# Patient Record
Sex: FEMALE | Race: WHITE | NOT HISPANIC OR LATINO | Employment: FULL TIME | URBAN - METROPOLITAN AREA
[De-identification: names, ages, dates, MRNs, and addresses within clinical notes are randomized per-mention and may not be internally consistent; named-entity substitution may affect disease eponyms.]

---

## 2023-02-03 PROBLEM — D23.9 DERMATOFIBROMA: Status: ACTIVE | Noted: 2023-02-03

## 2023-02-03 PROBLEM — J32.9 SINUSITIS: Status: ACTIVE | Noted: 2023-02-03

## 2023-02-03 PROBLEM — M20.40 HAMMERTOE: Status: ACTIVE | Noted: 2023-02-03

## 2023-02-03 PROBLEM — R07.89 STERNOCOSTAL PAIN: Status: ACTIVE | Noted: 2023-02-03

## 2023-02-03 PROBLEM — D17.30 NEVUS LIPOMATOSUS CUTANEOUS SUPERFICIALIS: Status: ACTIVE | Noted: 2023-02-03

## 2023-02-03 PROBLEM — T88.7XXA MEDICATION SIDE EFFECT: Status: ACTIVE | Noted: 2023-02-03
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
## 2023-02-03 PROBLEM — E03.9 HYPOTHYROIDISM, ADULT: Status: ACTIVE | Noted: 2023-02-03

## 2023-02-03 PROBLEM — E55.9 VITAMIN D DEFICIENCY: Status: ACTIVE | Noted: 2023-02-03

## 2023-02-03 PROBLEM — R73.9 HIGH BLOOD SUGAR: Status: ACTIVE | Noted: 2023-02-03

## 2023-02-03 PROBLEM — R63.5 WEIGHT GAIN: Status: ACTIVE | Noted: 2023-02-03

## 2023-02-03 PROBLEM — M54.9 BACK PAIN: Status: ACTIVE | Noted: 2023-02-03

## 2023-02-03 PROBLEM — M25.569 KNEE PAIN: Status: ACTIVE | Noted: 2023-02-03

## 2023-02-03 PROBLEM — R53.83 FATIGUE: Status: ACTIVE | Noted: 2023-02-03

## 2023-02-03 PROBLEM — G43.909 MIGRAINES: Status: ACTIVE | Noted: 2023-02-03

## 2023-02-03 PROBLEM — E66.9 OBESITY WITH BODY MASS INDEX (BMI) OF 30.0 TO 39.9: Status: ACTIVE | Noted: 2023-02-03

## 2023-02-03 PROBLEM — E66.9 OBESITY: Status: ACTIVE | Noted: 2023-02-03

## 2023-02-03 PROBLEM — D18.00 ANGIOMA: Status: ACTIVE | Noted: 2023-02-03

## 2023-02-03 PROBLEM — M77.9 TENDINITIS: Status: ACTIVE | Noted: 2023-02-03

## 2023-02-03 PROBLEM — L53.9 BREAST ERYTHEMA: Status: ACTIVE | Noted: 2023-02-03

## 2023-02-03 PROBLEM — M54.2 NECK PAIN: Status: ACTIVE | Noted: 2023-02-03

## 2023-02-03 PROBLEM — E78.5 DYSLIPIDEMIA: Status: ACTIVE | Noted: 2023-02-03

## 2023-02-03 PROBLEM — E87.6 HYPOKALEMIA: Status: ACTIVE | Noted: 2023-02-03

## 2023-02-03 PROBLEM — G58.8 INTERCOSTAL NEURALGIA: Status: ACTIVE | Noted: 2023-02-03

## 2023-02-03 PROBLEM — E66.01 MORBID (SEVERE) OBESITY DUE TO EXCESS CALORIES (MULTI): Status: ACTIVE | Noted: 2023-02-03

## 2023-02-03 PROBLEM — I10 HBP (HIGH BLOOD PRESSURE): Status: ACTIVE | Noted: 2023-02-03

## 2023-02-03 RX ORDER — CHLORTHALIDONE 25 MG/1
1 TABLET ORAL DAILY
COMMUNITY
Start: 2020-06-25 | End: 2023-03-27

## 2023-02-03 RX ORDER — LEVOTHYROXINE SODIUM 50 UG/1
1 TABLET ORAL DAILY
COMMUNITY
Start: 2019-12-03 | End: 2023-03-17 | Stop reason: SDUPTHER

## 2023-02-03 RX ORDER — UBIDECARENONE 30 MG
1 CAPSULE ORAL DAILY
COMMUNITY

## 2023-02-03 RX ORDER — LOSARTAN POTASSIUM 50 MG/1
50 TABLET ORAL DAILY
COMMUNITY
Start: 2020-10-08 | End: 2023-03-27

## 2023-02-03 RX ORDER — ACETAMINOPHEN 500 MG
50 TABLET ORAL DAILY
COMMUNITY
Start: 2019-12-03

## 2023-02-03 RX ORDER — AMLODIPINE BESYLATE 5 MG/1
1 TABLET ORAL DAILY
COMMUNITY
Start: 2019-12-03 | End: 2023-06-15 | Stop reason: SDUPTHER

## 2023-02-03 RX ORDER — SPIRONOLACTONE 25 MG/1
25 TABLET ORAL DAILY
COMMUNITY
Start: 2022-10-06 | End: 2023-03-17 | Stop reason: SDUPTHER

## 2023-03-16 LAB — POTASSIUM (MMOL/L) IN SER/PLAS: 3.9 MMOL/L (ref 3.5–5.3)

## 2023-03-17 ENCOUNTER — OFFICE VISIT (OUTPATIENT)
Dept: PRIMARY CARE | Facility: CLINIC | Age: 53
End: 2023-03-17
Payer: COMMERCIAL

## 2023-03-17 VITALS
BODY MASS INDEX: 45.29 KG/M2 | WEIGHT: 255.6 LBS | SYSTOLIC BLOOD PRESSURE: 110 MMHG | TEMPERATURE: 97.9 F | RESPIRATION RATE: 16 BRPM | HEART RATE: 72 BPM | DIASTOLIC BLOOD PRESSURE: 70 MMHG | HEIGHT: 63 IN

## 2023-03-17 DIAGNOSIS — R73.9 HIGH BLOOD SUGAR: ICD-10-CM

## 2023-03-17 DIAGNOSIS — E87.6 HYPOKALEMIA: ICD-10-CM

## 2023-03-17 DIAGNOSIS — E66.9 OBESITY WITH BODY MASS INDEX (BMI) OF 30.0 TO 39.9: ICD-10-CM

## 2023-03-17 DIAGNOSIS — E03.9 HYPOTHYROIDISM, ADULT: ICD-10-CM

## 2023-03-17 DIAGNOSIS — I10 PRIMARY HYPERTENSION: Primary | ICD-10-CM

## 2023-03-17 PROCEDURE — 3074F SYST BP LT 130 MM HG: CPT | Performed by: INTERNAL MEDICINE

## 2023-03-17 PROCEDURE — 99214 OFFICE O/P EST MOD 30 MIN: CPT | Performed by: INTERNAL MEDICINE

## 2023-03-17 PROCEDURE — 3078F DIAST BP <80 MM HG: CPT | Performed by: INTERNAL MEDICINE

## 2023-03-17 RX ORDER — SPIRONOLACTONE 25 MG/1
25 TABLET ORAL DAILY
Qty: 90 TABLET | Refills: 1 | Status: SHIPPED | OUTPATIENT
Start: 2023-03-17 | End: 2023-06-15 | Stop reason: SDUPTHER

## 2023-03-17 RX ORDER — LANOLIN ALCOHOL/MO/W.PET/CERES
500 CREAM (GRAM) TOPICAL DAILY
COMMUNITY

## 2023-03-17 RX ORDER — LEVOTHYROXINE SODIUM 50 UG/1
50 TABLET ORAL DAILY
Qty: 90 TABLET | Refills: 1 | Status: SHIPPED | OUTPATIENT
Start: 2023-03-17 | End: 2023-06-15 | Stop reason: SDUPTHER

## 2023-03-17 ASSESSMENT — ENCOUNTER SYMPTOMS
COUGH: 1
JOINT SWELLING: 0
WHEEZING: 0
UNEXPECTED WEIGHT CHANGE: 0
VOMITING: 0
WEAKNESS: 0
PALPITATIONS: 0
NAUSEA: 0
FATIGUE: 1
SLEEP DISTURBANCE: 0
ADENOPATHY: 0
CONSTIPATION: 0
CONFUSION: 0
CHEST TIGHTNESS: 0
ABDOMINAL PAIN: 0
DYSURIA: 0
SHORTNESS OF BREATH: 0
CHILLS: 0
DIZZINESS: 0
SORE THROAT: 0
DIARRHEA: 0
CARDIOVASCULAR NEGATIVE: 1
ARTHRALGIAS: 0

## 2023-03-17 ASSESSMENT — PATIENT HEALTH QUESTIONNAIRE - PHQ9
SUM OF ALL RESPONSES TO PHQ9 QUESTIONS 1 AND 2: 0
1. LITTLE INTEREST OR PLEASURE IN DOING THINGS: NOT AT ALL
2. FEELING DOWN, DEPRESSED OR HOPELESS: NOT AT ALL

## 2023-03-17 NOTE — PROGRESS NOTES
Ton Rangel is a 52 y.o. female who presents for Follow-up (Follow up HTN and K level/Labs done yesterday ).  Checking BP at home sometimes.is been good 110s/70s,, compliant with tx,  feels good, no med side effects        Review of Systems   Constitutional:  Positive for fatigue. Negative for chills and unexpected weight change.        Comment   HENT:  Negative for congestion, ear pain and sore throat.    Respiratory:  Positive for cough. Negative for chest tightness, shortness of breath and wheezing.    Cardiovascular: Negative.  Negative for palpitations and leg swelling.   Gastrointestinal:  Negative for abdominal pain, constipation, diarrhea, nausea and vomiting.   Genitourinary:  Negative for dysuria and urgency.   Musculoskeletal:  Negative for arthralgias and joint swelling.   Skin:  Negative for rash.   Neurological:  Negative for dizziness and weakness.   Hematological:  Negative for adenopathy.   Psychiatric/Behavioral:  Negative for confusion and sleep disturbance.        Objective   Physical Exam  Constitutional:       Appearance: Normal appearance.   HENT:      Head: Normocephalic and atraumatic.   Eyes:      Conjunctiva/sclera: Conjunctivae normal.   Neck:      Vascular: No carotid bruit.   Cardiovascular:      Rate and Rhythm: Normal rate and regular rhythm.      Heart sounds: No murmur heard.  Pulmonary:      Effort: No respiratory distress.      Breath sounds: No wheezing, rhonchi or rales.   Chest:      Chest wall: No tenderness.   Abdominal:      General: Bowel sounds are normal. There is no distension.      Palpations: Abdomen is soft. There is no mass.      Tenderness: There is no abdominal tenderness.   Musculoskeletal:         General: No swelling.      Cervical back: Neck supple.      Right lower leg: No edema.      Left lower leg: No edema.   Lymphadenopathy:      Cervical: No cervical adenopathy.   Skin:     Coloration: Skin is not jaundiced.      Findings: No rash.  "  Neurological:      General: No focal deficit present.      Mental Status: She is alert and oriented to person, place, and time. Mental status is at baseline.      Motor: No weakness.      Gait: Gait normal.   Psychiatric:         Mood and Affect: Mood normal.         Behavior: Behavior normal.         Judgment: Judgment normal.       /70 (Patient Position: Sitting)   Pulse 72   Temp 36.6 °C (97.9 °F)   Resp 16   Ht 1.6 m (5' 3\")   Wt 116 kg (255 lb 9.6 oz)   BMI 45.28 kg/m²     Assessment/Plan   Problem List Items Addressed This Visit       HBP (high blood pressure) - Primary    High blood sugar    Hypokalemia     K 3.9, stable for few month, will recheck q3 month         Obesity with body mass index (BMI) of 30.0 to 39.9          "

## 2023-03-25 DIAGNOSIS — I10 ESSENTIAL (PRIMARY) HYPERTENSION: ICD-10-CM

## 2023-03-27 RX ORDER — CHLORTHALIDONE 25 MG/1
TABLET ORAL
Qty: 30 TABLET | Refills: 2 | Status: SHIPPED | OUTPATIENT
Start: 2023-03-27 | End: 2023-10-09 | Stop reason: SDUPTHER

## 2023-03-27 RX ORDER — LOSARTAN POTASSIUM 50 MG/1
TABLET ORAL
Qty: 30 TABLET | Refills: 2 | Status: SHIPPED | OUTPATIENT
Start: 2023-03-27 | End: 2023-08-08

## 2023-04-27 ENCOUNTER — LAB (OUTPATIENT)
Dept: LAB | Facility: LAB | Age: 53
End: 2023-04-27
Payer: COMMERCIAL

## 2023-04-27 DIAGNOSIS — R73.9 HIGH BLOOD SUGAR: ICD-10-CM

## 2023-04-27 DIAGNOSIS — E66.9 OBESITY WITH BODY MASS INDEX (BMI) OF 30.0 TO 39.9: ICD-10-CM

## 2023-04-27 DIAGNOSIS — I10 PRIMARY HYPERTENSION: ICD-10-CM

## 2023-04-27 DIAGNOSIS — E03.9 HYPOTHYROIDISM, ADULT: ICD-10-CM

## 2023-04-27 PROCEDURE — 84443 ASSAY THYROID STIM HORMONE: CPT

## 2023-04-27 PROCEDURE — 82607 VITAMIN B-12: CPT

## 2023-04-27 PROCEDURE — 83036 HEMOGLOBIN GLYCOSYLATED A1C: CPT

## 2023-04-27 PROCEDURE — 80061 LIPID PANEL: CPT

## 2023-04-27 PROCEDURE — 36415 COLL VENOUS BLD VENIPUNCTURE: CPT

## 2023-04-27 PROCEDURE — 85025 COMPLETE CBC W/AUTO DIFF WBC: CPT

## 2023-04-27 PROCEDURE — 82306 VITAMIN D 25 HYDROXY: CPT

## 2023-04-27 PROCEDURE — 86803 HEPATITIS C AB TEST: CPT

## 2023-04-27 PROCEDURE — 80053 COMPREHEN METABOLIC PANEL: CPT

## 2023-04-28 LAB
ALANINE AMINOTRANSFERASE (SGPT) (U/L) IN SER/PLAS: 16 U/L (ref 7–45)
ALBUMIN (G/DL) IN SER/PLAS: 4.2 G/DL (ref 3.4–5)
ALKALINE PHOSPHATASE (U/L) IN SER/PLAS: 96 U/L (ref 33–110)
ANION GAP IN SER/PLAS: 12 MMOL/L (ref 10–20)
ASPARTATE AMINOTRANSFERASE (SGOT) (U/L) IN SER/PLAS: 11 U/L (ref 9–39)
BASOPHILS (10*3/UL) IN BLOOD BY AUTOMATED COUNT: 0.04 X10E9/L (ref 0–0.1)
BASOPHILS/100 LEUKOCYTES IN BLOOD BY AUTOMATED COUNT: 0.4 % (ref 0–2)
BILIRUBIN TOTAL (MG/DL) IN SER/PLAS: 0.4 MG/DL (ref 0–1.2)
CALCIDIOL (25 OH VITAMIN D3) (NG/ML) IN SER/PLAS: 50 NG/ML
CALCIUM (MG/DL) IN SER/PLAS: 9.4 MG/DL (ref 8.6–10.6)
CARBON DIOXIDE, TOTAL (MMOL/L) IN SER/PLAS: 32 MMOL/L (ref 21–32)
CHLORIDE (MMOL/L) IN SER/PLAS: 100 MMOL/L (ref 98–107)
CHOLESTEROL (MG/DL) IN SER/PLAS: 178 MG/DL (ref 0–199)
CHOLESTEROL IN HDL (MG/DL) IN SER/PLAS: 43.3 MG/DL
CHOLESTEROL/HDL RATIO: 4.1
COBALAMIN (VITAMIN B12) (PG/ML) IN SER/PLAS: 365 PG/ML (ref 211–911)
CREATININE (MG/DL) IN SER/PLAS: 0.83 MG/DL (ref 0.5–1.05)
EOSINOPHILS (10*3/UL) IN BLOOD BY AUTOMATED COUNT: 0.14 X10E9/L (ref 0–0.7)
EOSINOPHILS/100 LEUKOCYTES IN BLOOD BY AUTOMATED COUNT: 1.6 % (ref 0–6)
ERYTHROCYTE DISTRIBUTION WIDTH (RATIO) BY AUTOMATED COUNT: 13.7 % (ref 11.5–14.5)
ERYTHROCYTE MEAN CORPUSCULAR HEMOGLOBIN CONCENTRATION (G/DL) BY AUTOMATED: 31.1 G/DL (ref 32–36)
ERYTHROCYTE MEAN CORPUSCULAR VOLUME (FL) BY AUTOMATED COUNT: 94 FL (ref 80–100)
ERYTHROCYTES (10*6/UL) IN BLOOD BY AUTOMATED COUNT: 4.51 X10E12/L (ref 4–5.2)
ESTIMATED AVERAGE GLUCOSE FOR HBA1C: 123 MG/DL
GFR FEMALE: 84 ML/MIN/1.73M2
GLUCOSE (MG/DL) IN SER/PLAS: 117 MG/DL (ref 74–99)
HEMATOCRIT (%) IN BLOOD BY AUTOMATED COUNT: 42.4 % (ref 36–46)
HEMOGLOBIN (G/DL) IN BLOOD: 13.2 G/DL (ref 12–16)
HEMOGLOBIN A1C/HEMOGLOBIN TOTAL IN BLOOD: 5.9 %
HEPATITIS C VIRUS AB PRESENCE IN SERUM: NONREACTIVE
IMMATURE GRANULOCYTES/100 LEUKOCYTES IN BLOOD BY AUTOMATED COUNT: 0.3 % (ref 0–0.9)
LDL: 117 MG/DL (ref 0–99)
LEUKOCYTES (10*3/UL) IN BLOOD BY AUTOMATED COUNT: 9 X10E9/L (ref 4.4–11.3)
LYMPHOCYTES (10*3/UL) IN BLOOD BY AUTOMATED COUNT: 2.8 X10E9/L (ref 1.2–4.8)
LYMPHOCYTES/100 LEUKOCYTES IN BLOOD BY AUTOMATED COUNT: 31.2 % (ref 13–44)
MONOCYTES (10*3/UL) IN BLOOD BY AUTOMATED COUNT: 0.41 X10E9/L (ref 0.1–1)
MONOCYTES/100 LEUKOCYTES IN BLOOD BY AUTOMATED COUNT: 4.6 % (ref 2–10)
NEUTROPHILS (10*3/UL) IN BLOOD BY AUTOMATED COUNT: 5.55 X10E9/L (ref 1.2–7.7)
NEUTROPHILS/100 LEUKOCYTES IN BLOOD BY AUTOMATED COUNT: 61.9 % (ref 40–80)
NRBC (PER 100 WBCS) BY AUTOMATED COUNT: 0 /100 WBC (ref 0–0)
PLATELETS (10*3/UL) IN BLOOD AUTOMATED COUNT: 344 X10E9/L (ref 150–450)
POTASSIUM (MMOL/L) IN SER/PLAS: 4.5 MMOL/L (ref 3.5–5.3)
PROTEIN TOTAL: 6.7 G/DL (ref 6.4–8.2)
SODIUM (MMOL/L) IN SER/PLAS: 139 MMOL/L (ref 136–145)
THYROTROPIN (MIU/L) IN SER/PLAS BY DETECTION LIMIT <= 0.05 MIU/L: 2.06 MIU/L (ref 0.44–3.98)
TRIGLYCERIDE (MG/DL) IN SER/PLAS: 90 MG/DL (ref 0–149)
UREA NITROGEN (MG/DL) IN SER/PLAS: 18 MG/DL (ref 6–23)
VLDL: 18 MG/DL (ref 0–40)

## 2023-06-12 LAB — POTASSIUM (MMOL/L) IN SER/PLAS: 3.8 MMOL/L (ref 3.5–5.3)

## 2023-06-15 ENCOUNTER — TELEPHONE (OUTPATIENT)
Dept: PRIMARY CARE | Facility: CLINIC | Age: 53
End: 2023-06-15
Payer: COMMERCIAL

## 2023-06-15 DIAGNOSIS — E87.6 HYPOKALEMIA: Primary | ICD-10-CM

## 2023-06-15 DIAGNOSIS — I10 PRIMARY HYPERTENSION: ICD-10-CM

## 2023-06-15 DIAGNOSIS — E03.9 HYPOTHYROIDISM, ADULT: Primary | ICD-10-CM

## 2023-06-15 NOTE — TELEPHONE ENCOUNTER
Pt was in office 6/14/23 with her mother. Pt said she had Potassium lab order drawn on 6/12 but there we no other lab orders in. She wants lab orders entered. I explained to her that if lab orders are not already in, Dr. KASPER will order them at the time of her next visit (8/16/23 ). Pt said she wants order before visit.

## 2023-06-16 RX ORDER — SPIRONOLACTONE 25 MG/1
25 TABLET ORAL DAILY
Qty: 90 TABLET | Refills: 0 | Status: SHIPPED | OUTPATIENT
Start: 2023-06-16 | End: 2023-11-06

## 2023-06-16 RX ORDER — AMLODIPINE BESYLATE 5 MG/1
5 TABLET ORAL DAILY
Qty: 90 TABLET | Refills: 0 | Status: SHIPPED | OUTPATIENT
Start: 2023-06-16 | End: 2023-10-09 | Stop reason: SDUPTHER

## 2023-06-16 RX ORDER — LEVOTHYROXINE SODIUM 50 UG/1
50 TABLET ORAL DAILY
Qty: 90 TABLET | Refills: 0 | Status: SHIPPED | OUTPATIENT
Start: 2023-06-16 | End: 2023-10-09 | Stop reason: SDUPTHER

## 2023-08-06 DIAGNOSIS — I10 ESSENTIAL (PRIMARY) HYPERTENSION: Primary | ICD-10-CM

## 2023-08-08 RX ORDER — LOSARTAN POTASSIUM 50 MG/1
TABLET ORAL
Qty: 30 TABLET | Refills: 2 | Status: SHIPPED | OUTPATIENT
Start: 2023-08-08 | End: 2023-11-06

## 2023-08-16 ENCOUNTER — OFFICE VISIT (OUTPATIENT)
Dept: PRIMARY CARE | Facility: CLINIC | Age: 53
End: 2023-08-16
Payer: COMMERCIAL

## 2023-08-16 ENCOUNTER — LAB (OUTPATIENT)
Dept: LAB | Facility: LAB | Age: 53
End: 2023-08-16
Payer: COMMERCIAL

## 2023-08-16 VITALS
HEART RATE: 84 BPM | TEMPERATURE: 97.2 F | RESPIRATION RATE: 16 BRPM | BODY MASS INDEX: 44.61 KG/M2 | SYSTOLIC BLOOD PRESSURE: 110 MMHG | DIASTOLIC BLOOD PRESSURE: 62 MMHG | WEIGHT: 251.8 LBS | HEIGHT: 63 IN

## 2023-08-16 DIAGNOSIS — M54.2 NECK PAIN: ICD-10-CM

## 2023-08-16 DIAGNOSIS — I10 PRIMARY HYPERTENSION: ICD-10-CM

## 2023-08-16 DIAGNOSIS — E87.6 HYPOKALEMIA: ICD-10-CM

## 2023-08-16 DIAGNOSIS — G89.29 CHRONIC PAIN OF LEFT KNEE: ICD-10-CM

## 2023-08-16 DIAGNOSIS — E03.9 HYPOTHYROIDISM, ADULT: ICD-10-CM

## 2023-08-16 DIAGNOSIS — Z00.00 ANNUAL PHYSICAL EXAM: Primary | ICD-10-CM

## 2023-08-16 DIAGNOSIS — E78.5 DYSLIPIDEMIA: ICD-10-CM

## 2023-08-16 DIAGNOSIS — R73.9 HIGH BLOOD SUGAR: ICD-10-CM

## 2023-08-16 DIAGNOSIS — Z12.31 ENCOUNTER FOR SCREENING MAMMOGRAM FOR MALIGNANT NEOPLASM OF BREAST: ICD-10-CM

## 2023-08-16 DIAGNOSIS — E66.01 CLASS 3 SEVERE OBESITY WITH SERIOUS COMORBIDITY AND BODY MASS INDEX (BMI) OF 40.0 TO 44.9 IN ADULT, UNSPECIFIED OBESITY TYPE (MULTI): ICD-10-CM

## 2023-08-16 DIAGNOSIS — M25.562 CHRONIC PAIN OF LEFT KNEE: ICD-10-CM

## 2023-08-16 PROBLEM — E66.9 OBESITY: Status: RESOLVED | Noted: 2023-02-03 | Resolved: 2023-08-16

## 2023-08-16 PROBLEM — R63.5 WEIGHT GAIN: Status: RESOLVED | Noted: 2023-02-03 | Resolved: 2023-08-16

## 2023-08-16 PROBLEM — E66.813 CLASS 3 SEVERE OBESITY WITH SERIOUS COMORBIDITY AND BODY MASS INDEX (BMI) OF 40.0 TO 44.9 IN ADULT: Status: ACTIVE | Noted: 2023-02-03

## 2023-08-16 PROBLEM — E66.9 OBESITY WITH BODY MASS INDEX (BMI) OF 30.0 TO 39.9: Status: RESOLVED | Noted: 2023-02-03 | Resolved: 2023-08-16

## 2023-08-16 PROBLEM — J32.9 SINUSITIS: Status: RESOLVED | Noted: 2023-02-03 | Resolved: 2023-08-16

## 2023-08-16 PROBLEM — M25.569 KNEE PAIN: Status: RESOLVED | Noted: 2023-02-03 | Resolved: 2023-08-16

## 2023-08-16 LAB
MAGNESIUM (MG/DL) IN SER/PLAS: 1.91 MG/DL (ref 1.6–2.4)
POC HEMOGLOBIN A1C: 6.2 % (ref 4.2–6.5)
POTASSIUM (MMOL/L) IN SER/PLAS: 3.5 MMOL/L (ref 3.5–5.3)

## 2023-08-16 PROCEDURE — 36415 COLL VENOUS BLD VENIPUNCTURE: CPT

## 2023-08-16 PROCEDURE — 3074F SYST BP LT 130 MM HG: CPT | Performed by: INTERNAL MEDICINE

## 2023-08-16 PROCEDURE — 99213 OFFICE O/P EST LOW 20 MIN: CPT | Performed by: INTERNAL MEDICINE

## 2023-08-16 PROCEDURE — 3008F BODY MASS INDEX DOCD: CPT | Performed by: INTERNAL MEDICINE

## 2023-08-16 PROCEDURE — 84132 ASSAY OF SERUM POTASSIUM: CPT

## 2023-08-16 PROCEDURE — 99396 PREV VISIT EST AGE 40-64: CPT | Performed by: INTERNAL MEDICINE

## 2023-08-16 PROCEDURE — 3078F DIAST BP <80 MM HG: CPT | Performed by: INTERNAL MEDICINE

## 2023-08-16 PROCEDURE — 83735 ASSAY OF MAGNESIUM: CPT

## 2023-08-16 PROCEDURE — 83036 HEMOGLOBIN GLYCOSYLATED A1C: CPT | Performed by: INTERNAL MEDICINE

## 2023-08-16 ASSESSMENT — ENCOUNTER SYMPTOMS
SHORTNESS OF BREATH: 0
DIZZINESS: 0
SORE THROAT: 0
WEAKNESS: 0
JOINT SWELLING: 0
BACK PAIN: 1
ABDOMINAL PAIN: 0
FATIGUE: 1
NECK PAIN: 1
CHILLS: 0
DIARRHEA: 0
ARTHRALGIAS: 0
DYSURIA: 0
CONSTIPATION: 0
VOMITING: 0
PALPITATIONS: 0
BRUISES/BLEEDS EASILY: 1
COUGH: 0
ADENOPATHY: 0
UNEXPECTED WEIGHT CHANGE: 0
WHEEZING: 0
SLEEP DISTURBANCE: 1
CHEST TIGHTNESS: 0
CONFUSION: 0
NAUSEA: 0

## 2023-08-16 ASSESSMENT — PATIENT HEALTH QUESTIONNAIRE - PHQ9
2. FEELING DOWN, DEPRESSED OR HOPELESS: NOT AT ALL
SUM OF ALL RESPONSES TO PHQ9 QUESTIONS 1 AND 2: 0
1. LITTLE INTEREST OR PLEASURE IN DOING THINGS: NOT AT ALL

## 2023-08-16 NOTE — PROGRESS NOTES
Ton Rangel is a 53 y.o. female who presents for Annual Exam (CPE/Labs - 6.2023).  CPE  Lab -6.2023 and April discussed  Colonoscopy- 10.28.2019- Q10y  Mammogram- 9.8.2021  PAP-2020  Last HGBA1c was 5.9  on 4.27.2023, today is 6.2  Can't sleep sec to legs crams,       Review of Systems   Constitutional:  Positive for fatigue. Negative for chills and unexpected weight change.        Comment   HENT:  Negative for congestion, ear pain and sore throat.    Respiratory:  Negative for cough, chest tightness, shortness of breath and wheezing.    Cardiovascular:  Positive for leg swelling. Negative for palpitations.   Gastrointestinal:  Negative for abdominal pain, constipation, diarrhea, nausea and vomiting.   Genitourinary:  Negative for dysuria and urgency.   Musculoskeletal:  Positive for back pain and neck pain. Negative for arthralgias and joint swelling.   Skin:  Negative for rash.   Neurological:  Negative for dizziness and weakness.   Hematological:  Negative for adenopathy. Bruises/bleeds easily.   Psychiatric/Behavioral:  Positive for sleep disturbance. Negative for confusion.        Objective   Physical Exam  Constitutional:       Appearance: Normal appearance.   HENT:      Head: Normocephalic and atraumatic.   Eyes:      Conjunctiva/sclera: Conjunctivae normal.   Neck:      Vascular: No carotid bruit.   Cardiovascular:      Rate and Rhythm: Normal rate and regular rhythm.      Heart sounds: No murmur heard.  Pulmonary:      Effort: No respiratory distress.      Breath sounds: No wheezing, rhonchi or rales.   Chest:      Chest wall: No tenderness.   Abdominal:      General: Bowel sounds are normal. There is no distension.      Palpations: Abdomen is soft. There is no mass.      Tenderness: There is no abdominal tenderness.   Musculoskeletal:         General: Normal range of motion.      Cervical back: Neck supple.      Right foot: No deformity, bunion or Charcot foot.      Left foot: No  "deformity, bunion or Charcot foot.   Feet:      Right foot:      Skin integrity: Skin integrity normal. No ulcer, callus or fissure.      Toenail Condition: Right toenails are normal.      Left foot:      Skin integrity: Skin integrity normal. No ulcer, callus or fissure.      Toenail Condition: Left toenails are normal.      Comments: Foot exam normal,   Lymphadenopathy:      Cervical: No cervical adenopathy.   Skin:     Coloration: Skin is not jaundiced.      Findings: No rash.   Neurological:      General: No focal deficit present.      Mental Status: She is alert and oriented to person, place, and time. Mental status is at baseline.      Motor: No weakness.      Gait: Gait normal.   Psychiatric:         Mood and Affect: Mood normal.         Behavior: Behavior normal.         Judgment: Judgment normal.       /62 (BP Location: Left arm, Patient Position: Sitting)   Pulse 84   Temp 36.2 °C (97.2 °F)   Resp 16   Ht 1.6 m (5' 3\")   Wt 114 kg (251 lb 12.8 oz)   BMI 44.60 kg/m²       Assessment/Plan   Problem List Items Addressed This Visit       Dyslipidemia    HBP (high blood pressure)    High blood sugar    Relevant Orders    POCT glycosylated hemoglobin (Hb A1C) manually resulted (Completed)    Hypokalemia    Relevant Orders    Potassium    Magnesium    Hypothyroidism, adult    Chronic pain of left knee    Relevant Orders    XR knee right 3 views    Neck pain    Class 3 severe obesity with serious comorbidity and body mass index (BMI) of 40.0 to 44.9 in adult (CMS/[facility])     Seen by nutritionist, on mediterranean and low calorie diet         Annual physical exam - Primary     Other Visit Diagnoses       Encounter for screening mammogram for malignant neoplasm of breast        Relevant Orders    BI mammo bilateral screening tomosynthesis            "

## 2023-08-16 NOTE — PATIENT INSTRUCTIONS
Was nice seeing you today.  Continue same medication.  Have lab work done before next appointment if labs were ordered today.  Fu in 3 month.  Call/ contact our office with any concerns.

## 2023-08-18 ENCOUNTER — TELEMEDICINE (OUTPATIENT)
Dept: PRIMARY CARE | Facility: CLINIC | Age: 53
End: 2023-08-18
Payer: COMMERCIAL

## 2023-08-18 VITALS — HEIGHT: 63 IN | WEIGHT: 251 LBS | BODY MASS INDEX: 44.47 KG/M2

## 2023-08-18 DIAGNOSIS — B30.9 ACUTE VIRAL CONJUNCTIVITIS OF RIGHT EYE: Primary | ICD-10-CM

## 2023-08-18 PROBLEM — B30.8 CHRONIC VIRAL CONJUNCTIVITIS OF RIGHT EYE: Status: RESOLVED | Noted: 2023-08-18 | Resolved: 2023-08-18

## 2023-08-18 PROBLEM — B30.8 CHRONIC VIRAL CONJUNCTIVITIS OF RIGHT EYE: Status: ACTIVE | Noted: 2023-08-18

## 2023-08-18 PROCEDURE — 99212 OFFICE O/P EST SF 10 MIN: CPT | Performed by: INTERNAL MEDICINE

## 2023-08-18 RX ORDER — MELATONIN 5 MG
CAPSULE ORAL
COMMUNITY

## 2023-08-18 RX ORDER — CIPROFLOXACIN HYDROCHLORIDE 3 MG/ML
2 SOLUTION/ DROPS OPHTHALMIC
Qty: 5 ML | Refills: 1 | Status: SHIPPED | OUTPATIENT
Start: 2023-08-18 | End: 2023-08-25

## 2023-08-18 ASSESSMENT — ENCOUNTER SYMPTOMS
RESPIRATORY NEGATIVE: 1
CARDIOVASCULAR NEGATIVE: 1
EYE ITCHING: 1
HEADACHES: 1
DIZZINESS: 1
EYE REDNESS: 1
EYE DISCHARGE: 1
FATIGUE: 1

## 2023-08-18 ASSESSMENT — PATIENT HEALTH QUESTIONNAIRE - PHQ9
1. LITTLE INTEREST OR PLEASURE IN DOING THINGS: NOT AT ALL
2. FEELING DOWN, DEPRESSED OR HOPELESS: NOT AT ALL
SUM OF ALL RESPONSES TO PHQ9 QUESTIONS 1 AND 2: 0

## 2023-08-18 NOTE — ASSESSMENT & PLAN NOTE
Local tx, avoid touching the other eye, hands hygiene  To see opthalmology if pain behind her eyes is getting worse to ro glaucoma.

## 2023-08-18 NOTE — PROGRESS NOTES
"Ton Rangel is a 53 y.o. female who presents for Eye Problem (R eye possible pink eye ).  Video diane- possible pink eye - R eye x1 , headache , pain bhind the eye yesterday, woke up this AM with red eye, discharge watery , itching , eye pain better now, no purulent discharged.  Advised to see ophthalmology if pain behind her eye is getting worse otherwise educated pt regarding pink eye.    Eye Problem   The right eye is affected. This is a new problem. The current episode started today. The problem occurs constantly. There is No known exposure to pink eye. Associated symptoms include an eye discharge, eye redness and itching.     Review of Systems   Constitutional:  Positive for fatigue.        Sometimes   Eyes:  Positive for discharge, redness and itching.   Respiratory: Negative.     Cardiovascular: Negative.    Neurological:  Positive for dizziness and headaches.       Objective   Physical Exam  Constitutional:       Comments: Right conjunctival redness, watery eye       Ht 1.6 m (5' 3\")   Wt 114 kg (251 lb)   BMI 44.46 kg/m²       Assessment/Plan   Problem List Items Addressed This Visit       Acute viral conjunctivitis of right eye - Primary     Local tx, avoid touching the other eye, hands hygiene  To see opthalmology if pain behind her eyes is getting worse to ro glaucoma.         Relevant Medications    ciprofloxacin (Ciloxan) 0.3 % ophthalmic solution       "

## 2023-10-12 ENCOUNTER — LAB (OUTPATIENT)
Dept: LAB | Facility: LAB | Age: 53
End: 2023-10-12
Payer: COMMERCIAL

## 2023-10-12 ENCOUNTER — HOSPITAL ENCOUNTER (OUTPATIENT)
Dept: RADIOLOGY | Facility: HOSPITAL | Age: 53
Discharge: HOME | End: 2023-10-12
Payer: COMMERCIAL

## 2023-10-12 ENCOUNTER — OFFICE VISIT (OUTPATIENT)
Dept: PRIMARY CARE | Facility: CLINIC | Age: 53
End: 2023-10-12
Payer: COMMERCIAL

## 2023-10-12 VITALS
RESPIRATION RATE: 14 BRPM | SYSTOLIC BLOOD PRESSURE: 118 MMHG | BODY MASS INDEX: 44.12 KG/M2 | TEMPERATURE: 97.7 F | HEIGHT: 63 IN | HEART RATE: 85 BPM | WEIGHT: 249 LBS | OXYGEN SATURATION: 96 % | DIASTOLIC BLOOD PRESSURE: 78 MMHG

## 2023-10-12 DIAGNOSIS — R35.0 FREQUENCY OF URINATION: Primary | ICD-10-CM

## 2023-10-12 DIAGNOSIS — K57.92 DIVERTICULITIS: ICD-10-CM

## 2023-10-12 LAB
ALBUMIN SERPL BCP-MCNC: 4.2 G/DL (ref 3.4–5)
ALP SERPL-CCNC: 86 U/L (ref 33–110)
ALT SERPL W P-5'-P-CCNC: 15 U/L (ref 7–45)
ANION GAP SERPL CALC-SCNC: 15 MMOL/L (ref 10–20)
AST SERPL W P-5'-P-CCNC: 13 U/L (ref 9–39)
BASOPHILS # BLD AUTO: 0.03 X10*3/UL (ref 0–0.1)
BASOPHILS NFR BLD AUTO: 0.2 %
BILIRUB SERPL-MCNC: 0.7 MG/DL (ref 0–1.2)
BUN SERPL-MCNC: 13 MG/DL (ref 6–23)
CALCIUM SERPL-MCNC: 9.6 MG/DL (ref 8.6–10.3)
CHLORIDE SERPL-SCNC: 96 MMOL/L (ref 98–107)
CO2 SERPL-SCNC: 29 MMOL/L (ref 21–32)
CREAT SERPL-MCNC: 0.99 MG/DL (ref 0.5–1.05)
EOSINOPHIL # BLD AUTO: 0.14 X10*3/UL (ref 0–0.7)
EOSINOPHIL NFR BLD AUTO: 1 %
ERYTHROCYTE [DISTWIDTH] IN BLOOD BY AUTOMATED COUNT: 13.2 % (ref 11.5–14.5)
GFR SERPL CREATININE-BSD FRML MDRD: 68 ML/MIN/1.73M*2
GLUCOSE SERPL-MCNC: 111 MG/DL (ref 74–99)
HCT VFR BLD AUTO: 39.9 % (ref 36–46)
HGB BLD-MCNC: 12.8 G/DL (ref 12–16)
IMM GRANULOCYTES # BLD AUTO: 0.05 X10*3/UL (ref 0–0.7)
IMM GRANULOCYTES NFR BLD AUTO: 0.4 % (ref 0–0.9)
LYMPHOCYTES # BLD AUTO: 3.33 X10*3/UL (ref 1.2–4.8)
LYMPHOCYTES NFR BLD AUTO: 23.6 %
MCH RBC QN AUTO: 29 PG (ref 26–34)
MCHC RBC AUTO-ENTMCNC: 32.1 G/DL (ref 32–36)
MCV RBC AUTO: 90 FL (ref 80–100)
MONOCYTES # BLD AUTO: 0.75 X10*3/UL (ref 0.1–1)
MONOCYTES NFR BLD AUTO: 5.3 %
NEUTROPHILS # BLD AUTO: 9.81 X10*3/UL (ref 1.2–7.7)
NEUTROPHILS NFR BLD AUTO: 69.5 %
NRBC BLD-RTO: 0 /100 WBCS (ref 0–0)
PLATELET # BLD AUTO: 553 X10*3/UL (ref 150–450)
PMV BLD AUTO: 10.6 FL (ref 7.5–11.5)
POC APPEARANCE, URINE: CLEAR
POC BILIRUBIN, URINE: ABNORMAL
POC BLOOD, URINE: NEGATIVE
POC COLOR, URINE: ABNORMAL
POC GLUCOSE, URINE: NEGATIVE MG/DL
POC KETONES, URINE: NEGATIVE MG/DL
POC LEUKOCYTES, URINE: NEGATIVE
POC NITRITE,URINE: NEGATIVE
POC PH, URINE: 6.5 PH
POC PROTEIN, URINE: ABNORMAL MG/DL
POC SPECIFIC GRAVITY, URINE: <=1.005
POC UROBILINOGEN, URINE: 0.2 EU/DL
POTASSIUM SERPL-SCNC: 4.1 MMOL/L (ref 3.5–5.3)
PROT SERPL-MCNC: 6.9 G/DL (ref 6.4–8.2)
RBC # BLD AUTO: 4.42 X10*6/UL (ref 4–5.2)
SODIUM SERPL-SCNC: 136 MMOL/L (ref 136–145)
WBC # BLD AUTO: 14.1 X10*3/UL (ref 4.4–11.3)

## 2023-10-12 PROCEDURE — 80053 COMPREHEN METABOLIC PANEL: CPT

## 2023-10-12 PROCEDURE — 81003 URINALYSIS AUTO W/O SCOPE: CPT | Performed by: INTERNAL MEDICINE

## 2023-10-12 PROCEDURE — 3008F BODY MASS INDEX DOCD: CPT | Performed by: INTERNAL MEDICINE

## 2023-10-12 PROCEDURE — 74177 CT ABD & PELVIS W/CONTRAST: CPT

## 2023-10-12 PROCEDURE — 36415 COLL VENOUS BLD VENIPUNCTURE: CPT

## 2023-10-12 PROCEDURE — 3074F SYST BP LT 130 MM HG: CPT | Performed by: INTERNAL MEDICINE

## 2023-10-12 PROCEDURE — 85025 COMPLETE CBC W/AUTO DIFF WBC: CPT

## 2023-10-12 PROCEDURE — 99213 OFFICE O/P EST LOW 20 MIN: CPT | Performed by: INTERNAL MEDICINE

## 2023-10-12 PROCEDURE — 74177 CT ABD & PELVIS W/CONTRAST: CPT | Performed by: RADIOLOGY

## 2023-10-12 PROCEDURE — 3078F DIAST BP <80 MM HG: CPT | Performed by: INTERNAL MEDICINE

## 2023-10-12 PROCEDURE — 2550000001 HC RX 255 CONTRASTS: Performed by: INTERNAL MEDICINE

## 2023-10-12 RX ORDER — AMOXICILLIN AND CLAVULANATE POTASSIUM 875; 125 MG/1; MG/1
1 TABLET, FILM COATED ORAL 2 TIMES DAILY
Qty: 20 TABLET | Refills: 0 | Status: SHIPPED | OUTPATIENT
Start: 2023-10-12 | End: 2023-10-23 | Stop reason: ALTCHOICE

## 2023-10-12 RX ADMIN — IOHEXOL 75 ML: 350 INJECTION, SOLUTION INTRAVENOUS at 17:13

## 2023-10-12 ASSESSMENT — ENCOUNTER SYMPTOMS
NAUSEA: 0
DIZZINESS: 0
CHEST TIGHTNESS: 0
CONSTIPATION: 0
CHILLS: 0
SHORTNESS OF BREATH: 0
FATIGUE: 0
COUGH: 0
PALPITATIONS: 0
ARTHRALGIAS: 0
ABDOMINAL PAIN: 0
DIARRHEA: 0
SORE THROAT: 0
ADENOPATHY: 0
DYSURIA: 0
JOINT SWELLING: 0
VOMITING: 0
WHEEZING: 0
WEAKNESS: 0
CONFUSION: 0
SLEEP DISTURBANCE: 0
UNEXPECTED WEIGHT CHANGE: 0

## 2023-10-12 NOTE — PATIENT INSTRUCTIONS
Clear liquid diet for few days , until pain gone, go to er if symptoms get worse.  Have a CT abd, labs  Start antb  Fu in 10 d /prn

## 2023-10-12 NOTE — PROGRESS NOTES
Ton Rangel is a 53 y.o. female who presents for UTI.  Patient went to Urgent care October 2nd, started before this. Patient finished medication Microbid 2 days ago..Had intense pressure hypogastric area , now better and has , vomiting on/off s, harp pain on/off on her sides.  It started with abd cramps, diarrhea,  gas , went to urgent care and was dx as uti, diarrhea resolved now but stili feels bloated and has abd cramps, some N and vomited in am.    UTI   This is a new problem. The current episode started in the past 7 days. The problem has been unchanged. The quality of the pain is described as aching and stabbing (HAvy pressure when stops urinating.). The pain is at a severity of 4/10. Associated symptoms include urgency. Pertinent negatives include no chills, discharge, nausea or vomiting. She has tried antibiotics (Microbid) for the symptoms. The treatment provided no relief.     Review of Systems   Constitutional:  Negative for chills, fatigue and unexpected weight change.        Comment   HENT:  Negative for congestion, ear pain and sore throat.    Respiratory:  Negative for cough, chest tightness, shortness of breath and wheezing.    Cardiovascular:  Negative for palpitations and leg swelling.   Gastrointestinal:  Negative for abdominal pain, constipation, diarrhea, nausea and vomiting.   Genitourinary:  Positive for urgency. Negative for dysuria.   Musculoskeletal:  Negative for arthralgias and joint swelling.   Skin:  Negative for rash.   Neurological:  Negative for dizziness and weakness.   Hematological:  Negative for adenopathy.   Psychiatric/Behavioral:  Negative for confusion and sleep disturbance.    Feeling Nausea and had acid Reflex this morning.    Objective   Physical Exam  Constitutional:       Appearance: Normal appearance.      Comments: Tenderness to palpation  LLQ, no guarding or rebounding   HENT:      Head: Normocephalic and atraumatic.   Eyes:      Pupils: Pupils are  "equal, round, and reactive to light.   Cardiovascular:      Rate and Rhythm: Normal rate and regular rhythm.   Pulmonary:      Effort: Pulmonary effort is normal.      Breath sounds: Normal breath sounds.   Musculoskeletal:         General: Normal range of motion.      Cervical back: Normal range of motion and neck supple.   Skin:     General: Skin is warm.   Neurological:      General: No focal deficit present.      Mental Status: She is alert and oriented to person, place, and time.   Psychiatric:         Mood and Affect: Mood normal.         Behavior: Behavior normal.       /78 (BP Location: Left arm, Patient Position: Sitting)   Pulse 85   Temp 36.5 °C (97.7 °F)   Resp 14   Ht 1.6 m (5' 3\")   Wt 113 kg (249 lb)   SpO2 96%   BMI 44.11 kg/m²       Assessment/Plan   Problem List Items Addressed This Visit       Diverticulitis    Relevant Medications    amoxicillin-pot clavulanate (Augmentin) 875-125 mg tablet    Other Relevant Orders    CT abdomen pelvis w and wo IV contrast    CBC and Auto Differential    Comprehensive Metabolic Panel     Other Visit Diagnoses       Frequency of urination    -  Primary    Relevant Orders    POCT UA Automated manually resulted (Completed)            "

## 2023-10-23 ENCOUNTER — TELEMEDICINE (OUTPATIENT)
Dept: PRIMARY CARE | Facility: CLINIC | Age: 53
End: 2023-10-23
Payer: COMMERCIAL

## 2023-10-23 DIAGNOSIS — K57.92 DIVERTICULITIS: Primary | ICD-10-CM

## 2023-10-23 PROBLEM — D22.5 MELANOCYTIC NEVI OF TRUNK: Status: ACTIVE | Noted: 2021-01-27

## 2023-10-23 PROBLEM — L82.1 OTHER SEBORRHEIC KERATOSIS: Status: ACTIVE | Noted: 2021-01-27

## 2023-10-23 PROCEDURE — 99214 OFFICE O/P EST MOD 30 MIN: CPT | Performed by: INTERNAL MEDICINE

## 2023-10-23 ASSESSMENT — ENCOUNTER SYMPTOMS
DIZZINESS: 0
FATIGUE: 0
SHORTNESS OF BREATH: 0
JOINT SWELLING: 0
SLEEP DISTURBANCE: 0
WEAKNESS: 0
CONSTIPATION: 0
COUGH: 0
UNEXPECTED WEIGHT CHANGE: 0
CHILLS: 0
VOMITING: 0
DIARRHEA: 0
ABDOMINAL PAIN: 0
CHEST TIGHTNESS: 0
SORE THROAT: 0
DYSURIA: 0
NAUSEA: 0
PALPITATIONS: 0
CONFUSION: 0
ARTHRALGIAS: 0
ADENOPATHY: 0
WHEEZING: 0

## 2023-10-23 NOTE — ASSESSMENT & PLAN NOTE
Much better, symptoms are gone,  Discussed diet, prevention  Call if recurrent pain  Had colonoscopy 2019, due next year.

## 2023-10-23 NOTE — PROGRESS NOTES
Ton Rangel is a 53 y.o. female who presents for Follow-up (10 day follow up /Labs -10.2023/CT abd -10.2023).  Video diane - 10 days follow up on diverticulitis  Labs -10.2023  CT abd-10.2023  Patient feels better, no more symptoms now , wants to discuss about   when symptoms are coming back   Labs , ct rev.  Was on Augmentin  Had colonoscopy in 2019    === 10/12/23 ===    CT ABDOMEN PELVIS W IV CONTRAST    - Impression -  Colonic diverticulosis with segment of wall thickening and edema  surrounding the distal descending colon compatible with acute  diverticulitis.    MACRO:  None    Signed by: Peter Mitchell 10/12/2023 7:04 PM  Dictation workstation:   [id number]  Lab Results   Component Value Date    WBC 14.1 (H) 10/12/2023    HGB 12.8 10/12/2023    HCT 39.9 10/12/2023    MCV 90 10/12/2023     (H) 10/12/2023       Review of Systems   Constitutional:  Negative for chills, fatigue and unexpected weight change.        Comment   HENT:  Negative for congestion, ear pain and sore throat.    Respiratory:  Negative for cough, chest tightness, shortness of breath and wheezing.    Cardiovascular:  Negative for palpitations and leg swelling.   Gastrointestinal:  Negative for abdominal pain, constipation, diarrhea, nausea and vomiting.   Genitourinary:  Negative for dysuria and urgency.   Musculoskeletal:  Negative for arthralgias and joint swelling.   Skin:  Negative for rash.   Neurological:  Negative for dizziness and weakness.   Hematological:  Negative for adenopathy.   Psychiatric/Behavioral:  Negative for confusion and sleep disturbance.        Objective   Physical Exam  There were no vitals taken for this visit.      Assessment/Plan   Problem List Items Addressed This Visit       Diverticulitis - Primary     Much better, symptoms are gone,  Discussed diet, prevention  Call if recurrent pain  Had colonoscopy 2019, due next year.

## 2023-11-05 DIAGNOSIS — I10 ESSENTIAL (PRIMARY) HYPERTENSION: ICD-10-CM

## 2023-11-05 DIAGNOSIS — I10 PRIMARY HYPERTENSION: Primary | ICD-10-CM

## 2023-11-06 RX ORDER — LOSARTAN POTASSIUM 50 MG/1
TABLET ORAL
Qty: 30 TABLET | Refills: 2 | Status: SHIPPED | OUTPATIENT
Start: 2023-11-06 | End: 2024-03-06

## 2023-11-06 RX ORDER — SPIRONOLACTONE 25 MG/1
25 TABLET ORAL DAILY
Qty: 30 TABLET | Refills: 3 | Status: SHIPPED | OUTPATIENT
Start: 2023-11-06 | End: 2024-03-08 | Stop reason: SDUPTHER

## 2023-11-22 ENCOUNTER — APPOINTMENT (OUTPATIENT)
Dept: PRIMARY CARE | Facility: CLINIC | Age: 53
End: 2023-11-22
Payer: COMMERCIAL

## 2024-01-06 DIAGNOSIS — E03.9 HYPOTHYROIDISM, ADULT: Primary | ICD-10-CM

## 2024-01-06 DIAGNOSIS — I10 ESSENTIAL (PRIMARY) HYPERTENSION: ICD-10-CM

## 2024-01-06 DIAGNOSIS — I10 PRIMARY HYPERTENSION: ICD-10-CM

## 2024-01-08 RX ORDER — LEVOTHYROXINE SODIUM 50 UG/1
50 TABLET ORAL DAILY
Qty: 30 TABLET | Refills: 2 | Status: SHIPPED | OUTPATIENT
Start: 2024-01-08 | End: 2024-04-09

## 2024-01-08 RX ORDER — CHLORTHALIDONE 25 MG/1
TABLET ORAL
Qty: 30 TABLET | Refills: 2 | Status: SHIPPED | OUTPATIENT
Start: 2024-01-08 | End: 2024-04-09

## 2024-01-08 RX ORDER — AMLODIPINE BESYLATE 5 MG/1
5 TABLET ORAL DAILY
Qty: 30 TABLET | Refills: 2 | Status: SHIPPED | OUTPATIENT
Start: 2024-01-08 | End: 2024-04-09

## 2024-02-22 ENCOUNTER — OFFICE VISIT (OUTPATIENT)
Dept: PRIMARY CARE | Facility: CLINIC | Age: 54
End: 2024-02-22
Payer: COMMERCIAL

## 2024-02-22 VITALS
BODY MASS INDEX: 45.36 KG/M2 | TEMPERATURE: 98.1 F | HEIGHT: 63 IN | HEART RATE: 96 BPM | WEIGHT: 256 LBS | DIASTOLIC BLOOD PRESSURE: 64 MMHG | OXYGEN SATURATION: 96 % | RESPIRATION RATE: 16 BRPM | SYSTOLIC BLOOD PRESSURE: 112 MMHG

## 2024-02-22 DIAGNOSIS — I10 PRIMARY HYPERTENSION: ICD-10-CM

## 2024-02-22 DIAGNOSIS — E55.9 VITAMIN D DEFICIENCY: ICD-10-CM

## 2024-02-22 DIAGNOSIS — K59.09 OTHER CONSTIPATION: ICD-10-CM

## 2024-02-22 DIAGNOSIS — R10.13 EPIGASTRIC PAIN: Primary | ICD-10-CM

## 2024-02-22 PROBLEM — B30.9 ACUTE VIRAL CONJUNCTIVITIS OF RIGHT EYE: Status: RESOLVED | Noted: 2023-08-18 | Resolved: 2024-02-22

## 2024-02-22 PROCEDURE — 3078F DIAST BP <80 MM HG: CPT | Performed by: INTERNAL MEDICINE

## 2024-02-22 PROCEDURE — 1036F TOBACCO NON-USER: CPT | Performed by: INTERNAL MEDICINE

## 2024-02-22 PROCEDURE — 99213 OFFICE O/P EST LOW 20 MIN: CPT | Performed by: INTERNAL MEDICINE

## 2024-02-22 PROCEDURE — 3008F BODY MASS INDEX DOCD: CPT | Performed by: INTERNAL MEDICINE

## 2024-02-22 PROCEDURE — 3074F SYST BP LT 130 MM HG: CPT | Performed by: INTERNAL MEDICINE

## 2024-02-22 ASSESSMENT — ENCOUNTER SYMPTOMS
FATIGUE: 0
DIZZINESS: 0
NAUSEA: 0
VOMITING: 0
UNEXPECTED WEIGHT CHANGE: 0
SLEEP DISTURBANCE: 0
CHILLS: 0
DYSURIA: 0
JOINT SWELLING: 0
COUGH: 0
SORE THROAT: 0
CONFUSION: 0
ABDOMINAL PAIN: 1
PALPITATIONS: 0
WEAKNESS: 0
WHEEZING: 0
CONSTIPATION: 0
ADENOPATHY: 0
DIARRHEA: 0
CHEST TIGHTNESS: 0
SHORTNESS OF BREATH: 0
ARTHRALGIAS: 0

## 2024-02-22 NOTE — PROGRESS NOTES
Ton Rangel is a 53 y.o. female who presents for Follow-up (Diverticulitis) and Abdominal Pain.  Patient is here to Follow up on Diverticulitis, patient mentioned had a flare up  abd pain last week  for 2 days then was gone, and its taking a while to settle back, took some stool softener and clear liquid diet, now she has just mild discomfort.       Review of Systems   Constitutional:  Negative for chills, fatigue and unexpected weight change.        Comment   HENT:  Negative for congestion, ear pain and sore throat.    Respiratory:  Negative for cough, chest tightness, shortness of breath and wheezing.    Cardiovascular:  Negative for palpitations and leg swelling.   Gastrointestinal:  Positive for abdominal pain. Negative for constipation, diarrhea, nausea and vomiting.   Genitourinary:  Negative for dysuria and urgency.   Musculoskeletal:  Negative for arthralgias and joint swelling.   Skin:  Negative for rash.   Neurological:  Negative for dizziness and weakness.   Hematological:  Negative for adenopathy.   Psychiatric/Behavioral:  Negative for confusion and sleep disturbance.    All other systems reviewed and are negative.      Objective   Physical Exam  Constitutional:       Appearance: Normal appearance.      Comments: Soft , nontender   HENT:      Head: Normocephalic and atraumatic.   Eyes:      Pupils: Pupils are equal, round, and reactive to light.   Cardiovascular:      Rate and Rhythm: Normal rate and regular rhythm.   Pulmonary:      Effort: Pulmonary effort is normal.      Breath sounds: Normal breath sounds.   Musculoskeletal:         General: Normal range of motion.      Cervical back: Normal range of motion and neck supple.   Skin:     General: Skin is warm.   Neurological:      General: No focal deficit present.      Mental Status: She is alert and oriented to person, place, and time.   Psychiatric:         Mood and Affect: Mood normal.         Behavior: Behavior normal.       BP  "112/64 (BP Location: Left arm, Patient Position: Sitting)   Pulse 96   Temp 36.7 °C (98.1 °F)   Resp 16   Ht 1.6 m (5' 3\")   Wt 116 kg (256 lb)   SpO2 96%   BMI 45.35 kg/m²       Assessment/Plan   Problem List Items Addressed This Visit       HBP (high blood pressure)    Relevant Orders    CBC and Auto Differential    TSH with reflex to Free T4 if abnormal    Urinalysis with Reflex Microscopic    Vitamin B12    Magnesium    Lipid Panel    Hemoglobin A1C    Comprehensive Metabolic Panel    Vitamin D deficiency    Relevant Orders    Vitamin D 25-Hydroxy,Total (for eval of Vitamin D levels)    Epigastric pain - Primary    Relevant Orders    CBC and Auto Differential    Comprehensive Metabolic Panel    Other constipation       "

## 2024-02-22 NOTE — PATIENT INSTRUCTIONS
Was nice seeing you today.  Continue same medication.  Have lab work done before next appointment if labs were ordered today.  Fu prn/ cpe  Call/ contact our office with any concerns.    If you have labs or test done and you can't see the report in your chart or you didn't here from us in 2 weeks after test/labs done , please, call our office for reports.  Please , do not assume that they were normal.

## 2024-03-06 DIAGNOSIS — I10 ESSENTIAL (PRIMARY) HYPERTENSION: Primary | ICD-10-CM

## 2024-03-06 RX ORDER — LOSARTAN POTASSIUM 50 MG/1
TABLET ORAL
Qty: 30 TABLET | Refills: 2 | Status: SHIPPED | OUTPATIENT
Start: 2024-03-06 | End: 2024-06-10

## 2024-03-08 DIAGNOSIS — I10 PRIMARY HYPERTENSION: ICD-10-CM

## 2024-03-08 RX ORDER — SPIRONOLACTONE 25 MG/1
25 TABLET ORAL DAILY
Qty: 30 TABLET | Refills: 3 | Status: SHIPPED | OUTPATIENT
Start: 2024-03-08

## 2024-04-07 DIAGNOSIS — E03.9 HYPOTHYROIDISM, ADULT: ICD-10-CM

## 2024-04-07 DIAGNOSIS — I10 PRIMARY HYPERTENSION: ICD-10-CM

## 2024-04-07 DIAGNOSIS — I10 ESSENTIAL (PRIMARY) HYPERTENSION: ICD-10-CM

## 2024-04-09 RX ORDER — CHLORTHALIDONE 25 MG/1
TABLET ORAL
Qty: 30 TABLET | Refills: 2 | Status: SHIPPED | OUTPATIENT
Start: 2024-04-09

## 2024-04-09 RX ORDER — LEVOTHYROXINE SODIUM 50 UG/1
50 TABLET ORAL DAILY
Qty: 30 TABLET | Refills: 2 | Status: SHIPPED | OUTPATIENT
Start: 2024-04-09

## 2024-04-09 RX ORDER — AMLODIPINE BESYLATE 5 MG/1
5 TABLET ORAL DAILY
Qty: 30 TABLET | Refills: 2 | Status: SHIPPED | OUTPATIENT
Start: 2024-04-09

## 2024-06-09 DIAGNOSIS — I10 ESSENTIAL (PRIMARY) HYPERTENSION: ICD-10-CM

## 2024-06-10 RX ORDER — LOSARTAN POTASSIUM 50 MG/1
TABLET ORAL
Qty: 30 TABLET | Refills: 2 | Status: SHIPPED | OUTPATIENT
Start: 2024-06-10

## 2024-07-08 DIAGNOSIS — I10 PRIMARY HYPERTENSION: ICD-10-CM

## 2024-07-08 DIAGNOSIS — E03.9 HYPOTHYROIDISM, ADULT: ICD-10-CM

## 2024-07-08 DIAGNOSIS — I10 ESSENTIAL (PRIMARY) HYPERTENSION: ICD-10-CM

## 2024-07-08 RX ORDER — SPIRONOLACTONE 25 MG/1
25 TABLET ORAL DAILY
Qty: 30 TABLET | Refills: 3 | Status: SHIPPED | OUTPATIENT
Start: 2024-07-08

## 2024-07-08 RX ORDER — LEVOTHYROXINE SODIUM 50 UG/1
50 TABLET ORAL DAILY
Qty: 30 TABLET | Refills: 2 | Status: SHIPPED | OUTPATIENT
Start: 2024-07-08

## 2024-07-08 RX ORDER — AMLODIPINE BESYLATE 5 MG/1
5 TABLET ORAL DAILY
Qty: 30 TABLET | Refills: 2 | Status: SHIPPED | OUTPATIENT
Start: 2024-07-08

## 2024-07-08 RX ORDER — CHLORTHALIDONE 25 MG/1
TABLET ORAL
Qty: 30 TABLET | Refills: 2 | Status: SHIPPED | OUTPATIENT
Start: 2024-07-08

## 2024-08-06 ENCOUNTER — LAB (OUTPATIENT)
Dept: LAB | Facility: LAB | Age: 54
End: 2024-08-06
Payer: COMMERCIAL

## 2024-08-06 DIAGNOSIS — I10 PRIMARY HYPERTENSION: ICD-10-CM

## 2024-08-06 DIAGNOSIS — R10.13 EPIGASTRIC PAIN: ICD-10-CM

## 2024-08-06 DIAGNOSIS — E55.9 VITAMIN D DEFICIENCY: ICD-10-CM

## 2024-08-06 DIAGNOSIS — E87.6 HYPOKALEMIA: ICD-10-CM

## 2024-08-06 LAB
25(OH)D3 SERPL-MCNC: 59 NG/ML (ref 30–100)
ALBUMIN SERPL BCP-MCNC: 4.3 G/DL (ref 3.4–5)
ALP SERPL-CCNC: 87 U/L (ref 33–110)
ALT SERPL W P-5'-P-CCNC: 23 U/L (ref 7–45)
ANION GAP SERPL CALC-SCNC: 12 MMOL/L (ref 10–20)
APPEARANCE UR: CLEAR
AST SERPL W P-5'-P-CCNC: 16 U/L (ref 9–39)
BASOPHILS # BLD AUTO: 0.05 X10*3/UL (ref 0–0.1)
BASOPHILS NFR BLD AUTO: 0.5 %
BILIRUB SERPL-MCNC: 0.5 MG/DL (ref 0–1.2)
BILIRUB UR STRIP.AUTO-MCNC: NEGATIVE MG/DL
BUN SERPL-MCNC: 19 MG/DL (ref 6–23)
CALCIUM SERPL-MCNC: 9.4 MG/DL (ref 8.6–10.3)
CHLORIDE SERPL-SCNC: 99 MMOL/L (ref 98–107)
CHOLEST SERPL-MCNC: 202 MG/DL (ref 0–199)
CHOLESTEROL/HDL RATIO: 4.6
CO2 SERPL-SCNC: 31 MMOL/L (ref 21–32)
COLOR UR: ABNORMAL
CREAT SERPL-MCNC: 0.94 MG/DL (ref 0.5–1.05)
EGFRCR SERPLBLD CKD-EPI 2021: 72 ML/MIN/1.73M*2
EOSINOPHIL # BLD AUTO: 0.16 X10*3/UL (ref 0–0.7)
EOSINOPHIL NFR BLD AUTO: 1.6 %
ERYTHROCYTE [DISTWIDTH] IN BLOOD BY AUTOMATED COUNT: 13.5 % (ref 11.5–14.5)
EST. AVERAGE GLUCOSE BLD GHB EST-MCNC: 151 MG/DL
GLUCOSE SERPL-MCNC: 161 MG/DL (ref 74–99)
GLUCOSE UR STRIP.AUTO-MCNC: NORMAL MG/DL
HBA1C MFR BLD: 6.9 %
HCT VFR BLD AUTO: 40.7 % (ref 36–46)
HDLC SERPL-MCNC: 43.8 MG/DL
HGB BLD-MCNC: 13.8 G/DL (ref 12–16)
IMM GRANULOCYTES # BLD AUTO: 0.03 X10*3/UL (ref 0–0.7)
IMM GRANULOCYTES NFR BLD AUTO: 0.3 % (ref 0–0.9)
KETONES UR STRIP.AUTO-MCNC: NEGATIVE MG/DL
LDLC SERPL CALC-MCNC: 136 MG/DL
LEUKOCYTE ESTERASE UR QL STRIP.AUTO: NEGATIVE
LYMPHOCYTES # BLD AUTO: 3.66 X10*3/UL (ref 1.2–4.8)
LYMPHOCYTES NFR BLD AUTO: 35.9 %
MAGNESIUM SERPL-MCNC: 1.73 MG/DL (ref 1.6–2.4)
MCH RBC QN AUTO: 30.1 PG (ref 26–34)
MCHC RBC AUTO-ENTMCNC: 33.9 G/DL (ref 32–36)
MCV RBC AUTO: 89 FL (ref 80–100)
MONOCYTES # BLD AUTO: 0.39 X10*3/UL (ref 0.1–1)
MONOCYTES NFR BLD AUTO: 3.8 %
MUCOUS THREADS #/AREA URNS AUTO: NORMAL /LPF
NEUTROPHILS # BLD AUTO: 5.91 X10*3/UL (ref 1.2–7.7)
NEUTROPHILS NFR BLD AUTO: 57.9 %
NITRITE UR QL STRIP.AUTO: NEGATIVE
NON HDL CHOLESTEROL: 158 MG/DL (ref 0–149)
NRBC BLD-RTO: 0 /100 WBCS (ref 0–0)
PH UR STRIP.AUTO: 6.5 [PH]
PLATELET # BLD AUTO: 338 X10*3/UL (ref 150–450)
POTASSIUM SERPL-SCNC: 3.8 MMOL/L (ref 3.5–5.3)
PROT SERPL-MCNC: 6.6 G/DL (ref 6.4–8.2)
PROT UR STRIP.AUTO-MCNC: NEGATIVE MG/DL
RBC # BLD AUTO: 4.59 X10*6/UL (ref 4–5.2)
RBC # UR STRIP.AUTO: ABNORMAL /UL
RBC #/AREA URNS AUTO: NORMAL /HPF
SODIUM SERPL-SCNC: 138 MMOL/L (ref 136–145)
SP GR UR STRIP.AUTO: 1.01
SQUAMOUS #/AREA URNS AUTO: NORMAL /HPF
TRIGL SERPL-MCNC: 112 MG/DL (ref 0–149)
TSH SERPL-ACNC: 2.45 MIU/L (ref 0.44–3.98)
UROBILINOGEN UR STRIP.AUTO-MCNC: NORMAL MG/DL
VIT B12 SERPL-MCNC: 352 PG/ML (ref 211–911)
VLDL: 22 MG/DL (ref 0–40)
WBC # BLD AUTO: 10.2 X10*3/UL (ref 4.4–11.3)
WBC #/AREA URNS AUTO: NORMAL /HPF

## 2024-08-06 PROCEDURE — 85025 COMPLETE CBC W/AUTO DIFF WBC: CPT

## 2024-08-06 PROCEDURE — 80053 COMPREHEN METABOLIC PANEL: CPT

## 2024-08-06 PROCEDURE — 83036 HEMOGLOBIN GLYCOSYLATED A1C: CPT

## 2024-08-06 PROCEDURE — 36415 COLL VENOUS BLD VENIPUNCTURE: CPT

## 2024-08-06 PROCEDURE — 84443 ASSAY THYROID STIM HORMONE: CPT

## 2024-08-06 PROCEDURE — 80061 LIPID PANEL: CPT

## 2024-08-06 PROCEDURE — 83735 ASSAY OF MAGNESIUM: CPT

## 2024-08-06 PROCEDURE — 82306 VITAMIN D 25 HYDROXY: CPT

## 2024-08-06 PROCEDURE — 81001 URINALYSIS AUTO W/SCOPE: CPT

## 2024-08-06 PROCEDURE — 82607 VITAMIN B-12: CPT

## 2024-08-19 ENCOUNTER — APPOINTMENT (OUTPATIENT)
Dept: PRIMARY CARE | Facility: CLINIC | Age: 54
End: 2024-08-19
Payer: COMMERCIAL

## 2024-08-21 ENCOUNTER — APPOINTMENT (OUTPATIENT)
Dept: PRIMARY CARE | Facility: CLINIC | Age: 54
End: 2024-08-21
Payer: COMMERCIAL

## 2024-08-21 VITALS
TEMPERATURE: 97.6 F | BODY MASS INDEX: 46.42 KG/M2 | SYSTOLIC BLOOD PRESSURE: 110 MMHG | DIASTOLIC BLOOD PRESSURE: 62 MMHG | OXYGEN SATURATION: 96 % | HEIGHT: 63 IN | RESPIRATION RATE: 16 BRPM | WEIGHT: 262 LBS | HEART RATE: 93 BPM

## 2024-08-21 DIAGNOSIS — E78.5 DYSLIPIDEMIA: ICD-10-CM

## 2024-08-21 DIAGNOSIS — Z00.00 ANNUAL PHYSICAL EXAM: Primary | ICD-10-CM

## 2024-08-21 DIAGNOSIS — K59.09 OTHER CONSTIPATION: ICD-10-CM

## 2024-08-21 DIAGNOSIS — E03.9 HYPOTHYROIDISM, ADULT: ICD-10-CM

## 2024-08-21 DIAGNOSIS — E11.9 CONTROLLED TYPE 2 DIABETES MELLITUS WITHOUT COMPLICATION, WITHOUT LONG-TERM CURRENT USE OF INSULIN (MULTI): ICD-10-CM

## 2024-08-21 DIAGNOSIS — I10 PRIMARY HYPERTENSION: ICD-10-CM

## 2024-08-21 DIAGNOSIS — Z12.31 ENCOUNTER FOR SCREENING MAMMOGRAM FOR MALIGNANT NEOPLASM OF BREAST: ICD-10-CM

## 2024-08-21 DIAGNOSIS — R10.13 EPIGASTRIC PAIN: ICD-10-CM

## 2024-08-21 PROCEDURE — 99214 OFFICE O/P EST MOD 30 MIN: CPT | Performed by: INTERNAL MEDICINE

## 2024-08-21 PROCEDURE — 99396 PREV VISIT EST AGE 40-64: CPT | Performed by: INTERNAL MEDICINE

## 2024-08-21 PROCEDURE — 3074F SYST BP LT 130 MM HG: CPT | Performed by: INTERNAL MEDICINE

## 2024-08-21 PROCEDURE — 4010F ACE/ARB THERAPY RXD/TAKEN: CPT | Performed by: INTERNAL MEDICINE

## 2024-08-21 PROCEDURE — 3078F DIAST BP <80 MM HG: CPT | Performed by: INTERNAL MEDICINE

## 2024-08-21 PROCEDURE — 3050F LDL-C >= 130 MG/DL: CPT | Performed by: INTERNAL MEDICINE

## 2024-08-21 PROCEDURE — 3008F BODY MASS INDEX DOCD: CPT | Performed by: INTERNAL MEDICINE

## 2024-08-21 PROCEDURE — 1036F TOBACCO NON-USER: CPT | Performed by: INTERNAL MEDICINE

## 2024-08-21 PROCEDURE — 3044F HG A1C LEVEL LT 7.0%: CPT | Performed by: INTERNAL MEDICINE

## 2024-08-21 RX ORDER — BUTYROSPERMUM PARKII(SHEA BUTTER), SIMMONDSIA CHINENSIS (JOJOBA) SEED OIL, ALOE BARBADENSIS LEAF EXTRACT .01; 1; 3.5 G/100G; G/100G; G/100G
250 LIQUID TOPICAL DAILY
COMMUNITY

## 2024-08-21 RX ORDER — METFORMIN HYDROCHLORIDE 500 MG/1
500 TABLET, EXTENDED RELEASE ORAL
Qty: 100 TABLET | Refills: 3 | Status: SHIPPED | OUTPATIENT
Start: 2024-08-21 | End: 2025-09-25

## 2024-08-21 ASSESSMENT — ENCOUNTER SYMPTOMS
NECK PAIN: 1
JOINT SWELLING: 0
ADENOPATHY: 0
CHEST TIGHTNESS: 0
WEAKNESS: 0
CHILLS: 0
CONSTIPATION: 1
PALPITATIONS: 0
FATIGUE: 1
BACK PAIN: 1
HEADACHES: 1
DIZZINESS: 0
WHEEZING: 0
SHORTNESS OF BREATH: 0
NAUSEA: 1
CONFUSION: 0
VOMITING: 1
ROS GI COMMENTS: SOMETIMES
DIARRHEA: 1
UNEXPECTED WEIGHT CHANGE: 0
SORE THROAT: 0
COUGH: 0
ARTHRALGIAS: 1
SLEEP DISTURBANCE: 1
DYSURIA: 0
ABDOMINAL PAIN: 1
BRUISES/BLEEDS EASILY: 1

## 2024-08-21 NOTE — PATIENT INSTRUCTIONS
You have newly diagnosed diabetes mellitus type 2.    We will start metformin 500 mg every day.  Please check for side effects like abdominal pain and diarrhea and let me know if you develop the symptoms.  If they are mild usually they go away after 2 to 3 weeks.    You should start also Crestor for cholesterol but I wanted to start the medication at least 2 weeks later does not interfere with the side effects.    Was nice seeing you today.  Continue same medication.  Have lab work done before next appointment if labs were ordered today.  Fu in 3 month.  Call/ contact our office with any concerns.    If you have labs or test done and you can't see the report in your chart or you didn't hear from us in 2 weeks after test/labs done , please, call our office for reports.  Please , do not assume that they were normal.    Any test results  and questions you might have , will be discussed at next visit -- please make sure to make a follow up appt after testing if reports are abnormal or you have questions.

## 2024-08-21 NOTE — PROGRESS NOTES
Ton Rangel is a 54 y.o. female who presents for Annual Exam (CPE).  CPE  Never a smoker  Labs - 8.2024  Colonoscopy-10.28.2019-Q10y  Mammogram order given 8.16.2023-not done yet  PAP- 1.16.2020      Review of Systems   Constitutional:  Positive for fatigue. Negative for chills and unexpected weight change.        Comment   HENT:  Negative for congestion, ear pain and sore throat.    Respiratory:  Negative for cough, chest tightness, shortness of breath and wheezing.    Cardiovascular:  Positive for leg swelling. Negative for palpitations.   Gastrointestinal:  Positive for abdominal pain, constipation, diarrhea, nausea and vomiting.        Sometimes   Genitourinary:  Negative for dysuria and urgency.   Musculoskeletal:  Positive for arthralgias, back pain and neck pain. Negative for joint swelling.   Skin:  Negative for rash.   Neurological:  Positive for headaches. Negative for dizziness and weakness.        Sometimes   Hematological:  Negative for adenopathy. Bruises/bleeds easily.   Psychiatric/Behavioral:  Positive for sleep disturbance. Negative for confusion.    All other systems reviewed and are negative.      Objective   Physical Exam  Constitutional:       Appearance: Normal appearance.      Comments: obese   HENT:      Head: Normocephalic and atraumatic.   Eyes:      Conjunctiva/sclera: Conjunctivae normal.   Neck:      Vascular: No carotid bruit.   Cardiovascular:      Rate and Rhythm: Normal rate and regular rhythm.      Heart sounds: No murmur heard.  Pulmonary:      Effort: No respiratory distress.      Breath sounds: No wheezing, rhonchi or rales.   Chest:      Chest wall: No tenderness.   Abdominal:      General: Bowel sounds are normal. There is no distension.      Palpations: Abdomen is soft. There is no mass.      Tenderness: There is no abdominal tenderness.   Musculoskeletal:         General: Normal range of motion.      Cervical back: Neck supple.   Lymphadenopathy:       "Cervical: No cervical adenopathy.   Skin:     Coloration: Skin is not jaundiced.      Findings: No rash.   Neurological:      General: No focal deficit present.      Mental Status: She is alert and oriented to person, place, and time. Mental status is at baseline.      Motor: No weakness.      Gait: Gait normal.   Psychiatric:         Mood and Affect: Mood normal.         Behavior: Behavior normal.         Judgment: Judgment normal.       /62 (BP Location: Right arm, Patient Position: Sitting)   Pulse 93   Temp 36.4 °C (97.6 °F)   Resp 16   Ht 1.6 m (5' 3\")   Wt 119 kg (262 lb)   LMP 05/05/2024 Comment: patient  has period 1 time per year,last time 5.5.2024  SpO2 96%   BMI 46.41 kg/m²     Lab Results   Component Value Date    WBC 10.2 08/06/2024    HGB 13.8 08/06/2024    HCT 40.7 08/06/2024    MCV 89 08/06/2024     08/06/2024     Lab Results   Component Value Date    GLUCOSE 161 (H) 08/06/2024    CALCIUM 9.4 08/06/2024     08/06/2024    K 3.8 08/06/2024    CO2 31 08/06/2024    CL 99 08/06/2024    BUN 19 08/06/2024    CREATININE 0.94 08/06/2024     Lab Results   Component Value Date    ALT 23 08/06/2024    AST 16 08/06/2024    ALKPHOS 87 08/06/2024    BILITOT 0.5 08/06/2024     Lab Results   Component Value Date    TSH 2.45 08/06/2024     Lab Results   Component Value Date    CHOL 202 (H) 08/06/2024    CHOL 178 04/27/2023    CHOL 205 (H) 08/02/2022     Lab Results   Component Value Date    HDL 43.8 08/06/2024    HDL 43.3 04/27/2023    HDL 53.4 08/02/2022     Lab Results   Component Value Date    LDLCALC 136 (H) 08/06/2024     Lab Results   Component Value Date    TRIG 112 08/06/2024    TRIG 90 04/27/2023    TRIG 107 08/02/2022     No components found for: \"CHOLHDL\"    Assessment/Plan   Problem List Items Addressed This Visit       Dyslipidemia    HBP (high blood pressure)    Hypothyroidism, adult    Annual physical exam - Primary    Epigastric pain    Other constipation    Controlled type 2 " diabetes mellitus without complication, without long-term current use of insulin (Multi)     New dx, will start metformin 500 mg qd         Relevant Medications    metFORMIN XR (Glucophage-XR) 500 mg 24 hr tablet    Other Relevant Orders    Referral to Diabetes Education    Referral to Nutrition Services     Other Visit Diagnoses       Encounter for screening mammogram for malignant neoplasm of breast        Relevant Orders    BI mammo bilateral screening tomosynthesis

## 2024-08-30 NOTE — PROGRESS NOTES
Reason for Visit:  Adriana Rangel is a 54 y.o. female who presents for Initial DSME Visit    DSME - Global Assessment    Marital Status: .  Support Person:   .    What do you hope to gain from this diabetes education visit? Knowledge on DM. How I'm supposed to take medicines and food. Had some contradictions on when to take Metformin and info on Ozempic.    Have you had diabetes education in the past?  No.  In Your words, what is Diabetes: there are 2 types. Insulin is type 1 and the other is blood sugar levels.  What Concerns you most about having diabetes: live expectancy    Readiness to Learn: demonstrates willingness to learn and demonstrates ability to learn  Preferred learning method: reading and writing and doing    Household Composition: living independently, with family    Demographics:   Difficulties with: None  Highest Level of Education: College Graduate  Race/Ethnic Origin: White/  Occupation:  accounting  Work hours: [phone]    Health Status:  Smoking/Tobacco Use: No, patient does not smoke or use tobacco.  Alcohol Use: Amount: rare, 1-2 drinks.    Type of Diabetes: Type 2  What year were you diagnosed with diabetes: recent  Family History: none    Patient Active Problem List    Diagnosis Date Noted    Controlled type 2 diabetes mellitus without complication, without long-term current use of insulin (Multi) 08/21/2024    Epigastric pain 02/22/2024    Other constipation 02/22/2024    Diverticulitis 10/12/2023    Annual physical exam 08/16/2023    Angioma 02/03/2023    Bedbug bite 02/03/2023    Breast erythema 02/03/2023    Dermatofibroma 02/03/2023    Dyslipidemia 02/03/2023    Fatigue 02/03/2023    Hammertoe 02/03/2023    HBP (high blood pressure) 02/03/2023    High blood sugar 02/03/2023    Hypokalemia 02/03/2023    Hypothyroidism, adult 02/03/2023    Back pain 02/03/2023    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    Chronic pain of left knee 02/03/2023    Medication side effect  02/03/2023    Migraines 02/03/2023    Neck pain 02/03/2023    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  ER Visits: No.  Primary Care Visits: Yes.  Last Eye Exam : due soon  Podiatry : NA  Dentist : every 6 months    Diabetes Self-Management Skills and Behaviors:   Do you exercise regularly?: No. Spur of the movement. Walking the dog    No. Average amount of hours slept per night: Does not sleep right through. Up in 4 hr then back to bed  How do you manage your diabetes when you are sick?: unsure    Diabetes Medications: oral agents  Current Outpatient Medications   Medication Sig Dispense Refill    amLODIPine (Norvasc) 5 mg tablet TAKE 1 TABLET BY MOUTH EVERY DAY 30 tablet 2    ascorbic acid (Vitamin C) 500 mg ER capsule Take 1 capsule (500 mg) by mouth once daily.      calcium carbonate/vitamin D3 (CALCIUM 500 + D, D3, ORAL) Take by mouth.      chlorthalidone (Hygroton) 25 mg tablet TAKE 1 TABLET BY MOUTH EVERY DAY 30 tablet 2    cholecalciferol (Vitamin D-3) 50 mcg (2,000 unit) capsule Take 1 capsule (50 mcg) by mouth once daily.      DM/acetaminophen/doxylamine (CORICIDIN HBP COLD-MULTI SYMPT ORAL) Take by mouth.      levothyroxine (Synthroid, Levoxyl) 50 mcg tablet TAKE 1 TABLET BY MOUTH EVERY DAY 30 tablet 2    losartan (Cozaar) 50 mg tablet TAKE 1 TABLET BY MOUTH EVERY DAY 30 tablet 2    melatonin 5 mg capsule Take by mouth.      metFORMIN XR (Glucophage-XR) 500 mg 24 hr tablet Take 1 tablet (500  "mg) by mouth once daily with breakfast. Do not crush, chew, or split. 100 tablet 3    mv-calcium-min-iron fm-FA-vitK (Multi For Her) 18 mg iron-600 mcg-80 mcg tablet Take 1 tablet by mouth 1 (one) time each day.      saccharomyces boulardii (Florastor) 250 mg capsule Take 1 capsule (250 mg) by mouth once daily.      spironolactone (Aldactone) 25 mg tablet TAKE 1 TABLET BY MOUTH EVERY DAY 30 tablet 3     No current facility-administered medications for this visit.       DM medications as patient is taking them:    \"Paperwork said 30 min before meal or with food. I'm confused. \"  Is taking Metformin once daily in the morning.     \"Doctor said start Metformin and then wait 2 weeks and start Ozempic.\"    Monitorng: None    Acute Complications-Safety: none    Hypoglycemia: not in past month, but in past if doesn't eat for a while does feel lightheaded and dizzy. Cranky.  Hypoglycemia Treatment: \"eat something\"    Hyperglycemia: polyuria  Hyperglycemia Treatment: none    Reproductive/Currently Pregnant : No.  Do you use birth control? : Yes  Are you planning to become pregnant in the future? : No  Have you reached Menopause? : No. Not sure.     Diabetes Assessment:   DM Interferes with other aspects of my life: neutral.  My level of stress is high: agree. 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   I struggle with making changes in my life: agree.  How do you handle stress: stretching my neck and back. Focus on something else that is pleasant such as flowers.   Most difficult part of managing DM: eating  healthy    DSME - Meal Planning and Diet Recall  Are you currently following any meal plan:  Trying to eat healthy mediterranean diet \"my mom lost weight, but I gained.\" .    Does your culture or Congregation require any of the following:  no  Who does the grocery shopping? patient  Who does the cooking in the home?  Mostly patient    How often do you eat out? 2-3   How many meals do you eat in per day: three.  Which meals do you tend to " skip: none  What do you drink with and between meals: water and sometimes milk or OJ    Diet Recall:   Meal 1: 2 days per week buy breakfast sandwich: egg, abdi; belvita breakfast cracker  Meal 2: sandwich: tunafish or pb and jelly or salad - just lettuce plus poppyseed dressing or cheese  Meal 3: steak/chicken/porkchops and potatoes and broccoli  Snacks: when in the day: sometimes snacks 8-9 at night. A beefstick or piece of fruit, raisins, nuts.     Haven't lately, but sometimes eats sherbert after dinner    DSME - Goals and Recommendations    [facility] Diabetes Education Program SMART Behavior Goal Setting:        S - Specific: Exactly what do you want to do        M - Measurable: Use a calendar or chart to track progress        A - Attainable: Take small steps to make bigger changes        R - Realistic: Pick something reasonable that you know you can do        T - Time Oriented: Choose a time limit (No longer than 6 months)    Specific Goal:   1. I will eat 1-2 fistfuls non-starchy vegetables at lunch daily    2. Take Metformin in the morning with breakfast.     Measurable: How will I track my goal:  I will keep track of my progress daily by  other .    Time Oriented: four weeks.    Topics Covered and Impression:    DSME Topics Covered During Visit:   Reviewing Medication Classes  Taking Medications as Prescribed  Healthy Meal Plan  MyPlate Method  Site selection/rotation for Ozempic  Ozempic and Metformin MOA and possible side effects  Carb counting, using exchange lists  Aim for 30-45 grams carbs per meal   Macro nutrients  Adding fermented foods to assist with gut health    DSME Topics for Follow-Up:   A1c Review  Understanding Diabetes Basics  Taking Medications as Prescribed  Review Glycemic Goals (CGM or SMBG)  Reviewed Hypoglycemia Signs/Symptoms/Tx Plan  Reviewed Hyperglycemia Signs/Symptoms/Tx Plan  Healthy Meal Plan  MyPlate Method    Materials provided during today's visit:  DM patient  "guide, 2 handouts on exercise, blood glucose log sheet, A1C handout with patient's level written along with target level, hypo/hyperglycemia signs/symptoms & treatment for hypoglycemia. Non-starchy vegetables list. [employer] healthy meal planning booklet.       Provider Impression: Patient presents for initial visit. She is interested in weight loss, but had questions and concerns about Ozempic. Questions answered. Said doctor told her she could start Ozempic in 2 weeks after trying Metformin. She does not have Ozempic medication yet. She already has some \"GI issues\" and had concerns that both Metformin and Ozempic could cause some as well. She's taking probiotic pills. Benefit of including fermented foods to improve gut health discussed. Time seems to be her biggest barrier when it comes to eating healthier. We discussed ways to plan and prepare healthy foods in advance such as cooking extra vegetables at dinner to use in other meals next day, make several days worth of breakfast to grab and go, buying veggies already sliced or cut that are easy to saute later. Said she's put on weight since doing more of a mediterranean diet. She's not sure why and her mother has lost weight eating similiarly. Her diet could include more fiber, fresh fruits and vegetables. She may set up appointment with RD in future for more guidance on weight loss.   The referring provider is within the same EMR and is able to see the DSMES provided and the outcomes.       Time: I personally spent a total of 60 minutes with the patient providing diabetes self-management education. Visit documentation will be sent electronically to referring provider.          "

## 2024-09-02 DIAGNOSIS — I10 ESSENTIAL (PRIMARY) HYPERTENSION: ICD-10-CM

## 2024-09-03 RX ORDER — LOSARTAN POTASSIUM 50 MG/1
TABLET ORAL
Qty: 30 TABLET | Refills: 2 | Status: SHIPPED | OUTPATIENT
Start: 2024-09-03

## 2024-09-06 ENCOUNTER — NUTRITION (OUTPATIENT)
Dept: NUTRITION | Facility: CLINIC | Age: 54
End: 2024-09-06
Payer: COMMERCIAL

## 2024-09-06 DIAGNOSIS — E11.9 CONTROLLED TYPE 2 DIABETES MELLITUS WITHOUT COMPLICATION, WITHOUT LONG-TERM CURRENT USE OF INSULIN (MULTI): ICD-10-CM

## 2024-09-06 PROCEDURE — G0108 DIAB MANAGE TRN  PER INDIV: HCPCS | Performed by: EMERGENCY MEDICINE

## 2024-09-09 ENCOUNTER — HOSPITAL ENCOUNTER (OUTPATIENT)
Dept: RADIOLOGY | Facility: CLINIC | Age: 54
Discharge: HOME | End: 2024-09-09
Payer: COMMERCIAL

## 2024-09-09 VITALS — WEIGHT: 262.35 LBS | BODY MASS INDEX: 44.79 KG/M2 | HEIGHT: 64 IN

## 2024-09-09 DIAGNOSIS — Z12.31 ENCOUNTER FOR SCREENING MAMMOGRAM FOR MALIGNANT NEOPLASM OF BREAST: ICD-10-CM

## 2024-09-09 PROCEDURE — 77063 BREAST TOMOSYNTHESIS BI: CPT | Performed by: RADIOLOGY

## 2024-09-09 PROCEDURE — 77067 SCR MAMMO BI INCL CAD: CPT | Performed by: RADIOLOGY

## 2024-09-09 PROCEDURE — 77067 SCR MAMMO BI INCL CAD: CPT

## 2024-09-18 DIAGNOSIS — E11.9 CONTROLLED TYPE 2 DIABETES MELLITUS WITHOUT COMPLICATION, WITHOUT LONG-TERM CURRENT USE OF INSULIN (MULTI): Primary | ICD-10-CM

## 2024-09-24 ENCOUNTER — TELEPHONE (OUTPATIENT)
Dept: PRIMARY CARE | Facility: CLINIC | Age: 54
End: 2024-09-24
Payer: COMMERCIAL

## 2024-09-25 DIAGNOSIS — E11.9 CONTROLLED TYPE 2 DIABETES MELLITUS WITHOUT COMPLICATION, WITHOUT LONG-TERM CURRENT USE OF INSULIN (MULTI): ICD-10-CM

## 2024-09-25 DIAGNOSIS — E66.01 CLASS 3 SEVERE OBESITY DUE TO EXCESS CALORIES WITH SERIOUS COMORBIDITY AND BODY MASS INDEX (BMI) OF 40.0 TO 44.9 IN ADULT: Primary | ICD-10-CM

## 2024-09-29 DIAGNOSIS — E03.9 HYPOTHYROIDISM, ADULT: ICD-10-CM

## 2024-09-29 DIAGNOSIS — I10 PRIMARY HYPERTENSION: ICD-10-CM

## 2024-09-29 DIAGNOSIS — I10 ESSENTIAL (PRIMARY) HYPERTENSION: ICD-10-CM

## 2024-09-30 RX ORDER — LEVOTHYROXINE SODIUM 50 UG/1
50 TABLET ORAL DAILY
Qty: 30 TABLET | Refills: 2 | Status: SHIPPED | OUTPATIENT
Start: 2024-09-30

## 2024-09-30 RX ORDER — AMLODIPINE BESYLATE 5 MG/1
5 TABLET ORAL DAILY
Qty: 30 TABLET | Refills: 2 | Status: SHIPPED | OUTPATIENT
Start: 2024-09-30

## 2024-09-30 RX ORDER — CHLORTHALIDONE 25 MG/1
TABLET ORAL
Qty: 30 TABLET | Refills: 2 | Status: SHIPPED | OUTPATIENT
Start: 2024-09-30

## 2024-10-11 ENCOUNTER — TELEMEDICINE CLINICAL SUPPORT (OUTPATIENT)
Dept: NUTRITION | Facility: CLINIC | Age: 54
End: 2024-10-11
Payer: COMMERCIAL

## 2024-10-11 DIAGNOSIS — E11.9 TYPE 2 DIABETES MELLITUS WITHOUT COMPLICATION, WITHOUT LONG-TERM CURRENT USE OF INSULIN (MULTI): ICD-10-CM

## 2024-10-11 PROCEDURE — G0108 DIAB MANAGE TRN  PER INDIV: HCPCS | Performed by: EMERGENCY MEDICINE

## 2024-10-11 NOTE — PROGRESS NOTES
Reason for Visit:  Adriana Rangel is a 54 y.o. female who presents for Follow-up DSME Visit    DSME - Global Assessment    Patient Active Problem List    Diagnosis Date Noted    Controlled type 2 diabetes mellitus without complication, without long-term current use of insulin (Multi) 08/21/2024    Epigastric pain 02/22/2024    Other constipation 02/22/2024    Diverticulitis 10/12/2023    Annual physical exam 08/16/2023    Angioma 02/03/2023    Bedbug bite 02/03/2023    Breast erythema 02/03/2023    Dermatofibroma 02/03/2023    Dyslipidemia 02/03/2023    Fatigue 02/03/2023    Hammertoe 02/03/2023    HBP (high blood pressure) 02/03/2023    High blood sugar 02/03/2023    Hypokalemia 02/03/2023    Hypothyroidism, adult 02/03/2023    Back pain 02/03/2023    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    Chronic pain of left knee 02/03/2023    Medication side effect 02/03/2023    Migraines 02/03/2023    Neck pain 02/03/2023    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    Class 3 severe obesity with serious comorbidity and body mass index (BMI) of 40.0 to 44.9 in adult 02/03/2023    Sternocostal pain 02/03/2023    Tendinitis 02/03/2023    Vitamin D deficiency 02/03/2023    Melanocytic nevi of trunk 01/27/2021    Other seborrheic keratosis 01/27/2021        Lab Results   Component Value Date    HGBA1C 6.9 (H) 08/06/2024    HGBA1C 6.2 08/16/2023    HGBA1C 5.9 (A) 04/27/2023    HGBA1C 5.5 08/17/2022         Diabetes Medications: oral agents  Current Outpatient Medications   Medication Sig Dispense Refill    amLODIPine (Norvasc) 5 mg tablet TAKE 1 TABLET BY MOUTH EVERY DAY 30 tablet 2    ascorbic acid (Vitamin C) 500 mg ER capsule Take 1 capsule (500 mg) by mouth once daily.      calcium carbonate/vitamin D3 (CALCIUM 500 + D, D3, ORAL) Take by mouth.      chlorthalidone (Hygroton) 25 mg tablet TAKE 1 TABLET BY MOUTH EVERY DAY 30 tablet 2    cholecalciferol (Vitamin D-3) 50 mcg (2,000 unit) capsule Take 1 capsule (50 mcg) by  mouth once daily.      DM/acetaminophen/doxylamine (CORICIDIN HBP COLD-MULTI SYMPT ORAL) Take by mouth.      levothyroxine (Synthroid, Levoxyl) 50 mcg tablet TAKE 1 TABLET BY MOUTH EVERY DAY 30 tablet 2    losartan (Cozaar) 50 mg tablet TAKE 1 TABLET BY MOUTH EVERY DAY 30 tablet 2    melatonin 5 mg capsule Take by mouth.      metFORMIN XR (Glucophage-XR) 500 mg 24 hr tablet Take 1 tablet (500 mg) by mouth once daily with breakfast. Do not crush, chew, or split. 100 tablet 3    mv-calcium-min-iron fm-FA-vitK (Multi For Her) 18 mg iron-600 mcg-80 mcg tablet Take 1 tablet by mouth 1 (one) time each day.      saccharomyces boulardii (Florastor) 250 mg capsule Take 1 capsule (250 mg) by mouth once daily.      semaglutide 0.25 mg or 0.5 mg (2 mg/3 mL) pen injector Inject 0.25 mg under the skin 1 (one) time per week. 3 mL 3    spironolactone (Aldactone) 25 mg tablet TAKE 1 TABLET BY MOUTH EVERY DAY 30 tablet 3     No current facility-administered medications for this visit.       DM medications as patient is taking them:    Taking Metformin as prescribed and is taking it once daily with breakfast meals.   Has not started Ozempic yet due to insurance not providing. She has to investigate    DSME - Goals and Recommendations    [facility] Diabetes Education Program SMART Behavior Goal Setting:        S - Specific: Exactly what do you want to do        M - Measurable: Use a calendar or chart to track progress        A - Attainable: Take small steps to make bigger changes        R - Realistic: Pick something reasonable that you know you can do        T - Time Oriented: Choose a time limit (No longer than 6 months)    Specific goal from prior visit:  1. I will eat 1-2 fistfuls non-starchy vegetables at lunch daily.   Meeting half of the time.   2. Take Metformin in the morning with breakfast.   Meeting goal.     Goal Met, mostly meeting, not met or ongoing    New Specific Goal:   1. I will eat 1-2 fistfuls non-starchy  vegetables at lunch daily.   Plans to recommit to goal so she will meet it most of the time. I will try putting lunch bag in fridge so I remember to take before leaving for week. If it's premade that will help. Said she needs to be more organized and plan her meals.        Measurable: How will I track my goal:  I will keep track of my progress daily by  other .    Time Oriented: three months.    Topics Covered and Impression:    DSME Topics Covered During Visit:   A1c Review  Understanding Diabetes Basics  Reviewing Medication Classes  Taking Medications as Prescribed  Being Physically Active  Review Glycemic Goals (CGM or SMBG)  Reviewed Hypoglycemia Signs/Symptoms/Tx Plan  Reviewed Hyperglycemia Signs/Symptoms/Tx Plan  Healthy Meal Plan  MyPlate Method        DSME Topics for Follow-Up:   Reviewed Chronic Complications/Risks Related to Diabetes  Being Physically Active  Healthy Meal Plan  Sick Day Rules    Materials provided during today's visit: Given at first visit    Provider Impression: Patient here for one month follow-up. Said her challenge to eating non-starchy vegetables at lunch because tired of salads. After some discussion she wants to recommit to goal and came up with some ways to be more successful. Will need to keep myself organized and plan my meals more. On weekends she make scrambled eggs for days I work instead of buying a breakfast sandwich. Sometimes has pre-cut veggies and forgets to grab them. I have been able to limit sweets this last month. Admits family brings it into the house and it's a challenge sometimes. She's been dividing breakfast such as scrambled eggs over a period of time. She eats some at 08 and again at 10. Was doing this so she didn't eat it all at once in 15 minutes. Encouraged her to eat her entire meal at one sitting and that there is no benefit to eat half again in 2 hr.  Role and benefit of regular physical activity in terms of blood sugar and overall healthy discussed.  "Using the \"Move Your Way\" handout we discussed difference between aerobic and muscle strengthening and long term goals to work towards: 150 minutes of aerobic activity and 2 days muscle strengthening activity per week. She does have some resistance bands from chiropractor. Has handout with free you tube videos she can try. Provided examples of how to set small goals and gradually build up.  The referring provider is within the same EMR and is able to see the DSMES provided and the outcomes.     Time: I personally spent a total of 60 minutes with the patient providing diabetes self-management education. Visit documentation will be sent electronically to referring provider.    "

## 2024-10-14 ENCOUNTER — APPOINTMENT (OUTPATIENT)
Dept: PHARMACY | Facility: HOSPITAL | Age: 54
End: 2024-10-14
Payer: COMMERCIAL

## 2024-10-14 ENCOUNTER — PHARMACY VISIT (OUTPATIENT)
Dept: PHARMACY | Facility: CLINIC | Age: 54
End: 2024-10-14

## 2024-10-14 DIAGNOSIS — E66.01 CLASS 3 SEVERE OBESITY DUE TO EXCESS CALORIES WITH SERIOUS COMORBIDITY AND BODY MASS INDEX (BMI) OF 40.0 TO 44.9 IN ADULT: ICD-10-CM

## 2024-10-14 DIAGNOSIS — E66.813 CLASS 3 SEVERE OBESITY DUE TO EXCESS CALORIES WITH SERIOUS COMORBIDITY AND BODY MASS INDEX (BMI) OF 40.0 TO 44.9 IN ADULT: ICD-10-CM

## 2024-10-14 DIAGNOSIS — E11.9 CONTROLLED TYPE 2 DIABETES MELLITUS WITHOUT COMPLICATION, WITHOUT LONG-TERM CURRENT USE OF INSULIN (MULTI): ICD-10-CM

## 2024-10-14 DIAGNOSIS — E11.9 TYPE 2 DIABETES MELLITUS WITHOUT COMPLICATION, WITHOUT LONG-TERM CURRENT USE OF INSULIN (MULTI): Primary | ICD-10-CM

## 2024-10-14 RX ORDER — METFORMIN HYDROCHLORIDE 500 MG/1
TABLET, EXTENDED RELEASE ORAL
Qty: 60 TABLET | Refills: 11 | Status: SHIPPED | OUTPATIENT
Start: 2024-10-14

## 2024-10-14 RX ORDER — ASPIRIN 325 MG
325 TABLET ORAL DAILY PRN
COMMUNITY

## 2024-10-14 ASSESSMENT — ENCOUNTER SYMPTOMS
VISUAL CHANGE: 0
FATIGUE: 1
WEAKNESS: 1

## 2024-10-14 NOTE — ASSESSMENT & PLAN NOTE
HPI:   Diabetes  She presents for her initial diabetic visit. She has type 2 diabetes mellitus. The initial diagnosis of diabetes was made 2 weeks ago. Her disease course has been stable. There are no hypoglycemic associated symptoms. Associated symptoms include fatigue, polyuria and weakness. Pertinent negatives for diabetes include no chest pain, no foot paresthesias, no foot ulcerations and no visual change. There are no hypoglycemic complications. Symptoms are stable. There are no diabetic complications. Risk factors for coronary artery disease include dyslipidemia, family history, obesity, hypertension, diabetes mellitus and sedentary lifestyle. Current diabetic treatment includes diet and oral agent (monotherapy). She is compliant with treatment some of the time. She is following a high fat/cholesterol and generally healthy diet. Meal planning includes avoidance of concentrated sweets. She has not had a previous visit with a dietitian. She participates in exercise intermittently. There is no change in her home blood glucose trend. An ACE inhibitor/angiotensin II receptor blocker is being taken. She does not see a podiatrist.Eye exam is current.     _____________________________________________________________________________    DIABETES MELLITUS type 2 & Obesity   _____________________________________________________________________________    Diagnosed with diabetes:  2024. Known diabetic complications: none  Does patient follow with Endocrinology: No  Last optometry exam: < 1 year, sees an eye doctor  Most recent visit in Podiatry: No, recent diagnoses - was educated on peripheral neuropathy as a complication of diabetes and the recommendation to have an annual visit to podiatry   **-- patient denies sores or cuts on feet today      Current diabetic medications include:  Metformin  mg once daily on it ~2 weeks     Clarifications to above regimen:   Has NOT started semaglutide due to insurance issues.  Patient was unable to say what the issue was with the medication, but stated that [facility] called her and said to call them back. Advised the patient to call the pharmacy, but also gave my best theory that it required a PA. I attempted to run a test claim through our EMR, but it stated that the information was incorrect. I verified the information x2 times with the patient (she had her card in her hand), but it was unable to give an approximation of cost. Potentially, it is because  retail pharmacy is not contracted with her insurance.   Adverse Effects: Patient denies any GI upset     Past diabetic medications include:  None    Glucose Readings:   Patient does not take her blood sugar at home nor has she ever done this in the past - if home BG readings become neccesary, the patient would require proper education  Patient does not have a glucometer at home  HYPOGLYCEMIA  Any episodes of hypoglycemia? N/A.  Does not take sugars at home  Did patient treat episode of hypoglycemia appropriately? N/A Patient is very low risk for hypoglycemia while on metformin monotherapy   Does the patient have a prescription for ready-to-use Glucagon? Not on insulin    Does pt have proteinuria? No    BMI: >/= 35? Yes, 45    Lifestyle:  Diet: 3 meals/day  BK: 2 scrambled eggs with olive oil or butter, cream of wheat, oatmeal packets (rarely) Pentecostal oats flavored oatmeal variety pack  LN: Salad (rarely with croutons does not use much dressing), or soup. Since the new DM diagnoses she states she is trying to stop eating sandwiches and bread in general  DN: Beef, potatoes are minimized, broccoli as a side, rice and or pasta ~3 days a week  Snacks: no longer snacking as she was told that she was not supposed snack anymore, but has on occasion ate celery and carrots, broccoli, or peanuts    Drinks: is only drinking water now, if she wants a flavored beverage she will have an iced tea with no sugar added (~3x a week)  good compliance with  carbohydrate counting, high fat food choices, and is trying to change her lifestyle habits. The patient exhibits a high level of understanding and knowledge regarding a diabetic diet and the benefits of increased exercise.   Physical Activity: Attempting to increase walking, walking dog (great dmoi) - 3x a week for 20-30 minutes,  usually     Secondary Prevention:  Statin? No   The 10-year ASCVD risk score (Yeimi CHRISTIANSEN, et al., 2019) is: 4.7%    Values used to calculate the score:      Age: 54 years      Sex: Female      Is Non- : No      Diabetic: Yes      Tobacco smoker: No      Systolic Blood Pressure: 110 mmHg      Is BP treated: Yes      HDL Cholesterol: 43.8 mg/dL      Total Cholesterol: 202 mg/dL  ACE-I/ARB? Yes  Aspirin? Uses it for headaches on occasion. States she used to take acetaminophen but as her mom lives with her and her mom was instructed not to take it, the patient stopped buying it. Educated on acetaminophen being the preferred medication    Pertinent PMH Review:  PMH of Pancreatitis: No  PMH of Retinopathy: No  PMH of Urinary Tract Infections: Yes, prior but no longer ~1 year ago  PMH of MTC: No  HF: No  _____________________________________________________________________________    Allergies   Reviewed? Yes  Changes? No    Medication Reconciliation:  OTCs?   Added - Aspirin if has a headache, acetaminophen    Drug Interactions  The following drug interactions were noted: aspirin and HTN    Medication System Management  Patient's preferred pharmacy: [facility]  Adherence/Medication Access:   Have you had any missed doses? Yes has not started ozempic. Denies missed doses of scheduled medication      _____________________________________________________________________________    DIABETES MELLITUS TYPE 2 & OBESITY PLAN:  ____________________________________________________________________________    Patient Goals  A1c: less than 7%;  Is pt at goal? No, but ideal goal of  <6.5  BMI < 30  Decrease LDL-C by 30-50%  Improve diet and increase exercise    Diabetes   The patient has just been diagnosed with type 2 diabetes and has only been taking 500mg of metformin for x2 weeks. Her diabetes is new and it cannot be determined if it is stable or improving. Her A1c has worsened over the past few years.   Rationale: Increase metformin.   Candidate for GLP? Yes; Medication not started yet due to insurance delays (prior authorization)  Candidate for SGLT2? Potentially in the future, due to her history of UTIs, this may not be the ideal choice; but may be an effective add on therapy to a GLP and metformin  Candidate for DPP4? No, not if the patient starts a GLP-1  Candidate for Metformin? Yes; was started at 500 mg, patient denied symptoms and has sufficient renal function.   Candidate for Insulin? No; A1c >/= 11? No  Medication Changes:  START   Semaglutide (Ozempic) injection - inject 0.25 mg under the skin once a week for 4 weeks   Once medication is approved through insurance, semaglutide (generic name) ozempic (brand name) will be started, but only AFTER you have either a phone, virtual, or in person appointment to go over the following information with your pharmacist    How to use the medication, proper storage, potential side effects, administration, benefits of this medication, and titration/schedule of doses  Completed Today  Discussed general issues about diabetes pathophysiology and management.  Counseling at today's visit: focused on the need for regular aerobic exercise and discussed the advantages of a diet low in carbohydrates.  Encouraged aerobic exercise.  Discussed foot care.  Reminded to get yearly retinal exam.  Increased dose of metformin; see  medication orders.  Labs: fasting blood sugar, fasting lipid panel, hemoglobin A1C, microalbuminuria, and CBC in 3 months. DM2 diagnosed ~2 weeks ago, last A1c at end of August - Next A1c due by end of 2024    Obesity   Current  regimen includes metformin and will start semaglutide when insurance issues are resolved. Patient's current weight reported as 262 lbs. Has lost 0 lbs (0% of TBW) and has not started therapy plan, but will once approved. Will send a PA to insurance company via cover my meds and send in a script to [facility] pharmacy to see if they have more insight regarding insurance test billing and steps to resolve.   Medication Changes: none, start semaglutide/ozempic when insurance issues resolved and/or PA is approved.     Future Considerations:  Add statin once the patient has an updated lipid panel  Mounjaro would provide more effective weight loss over semaglutide    Education:  Purpose of this clinical pharmacist appointment, why she was referred to us by her PCP and how this is a different service than her pharmacist at [facility] where her scripts get sent  Prior authorization process - has script at [facility]  Diet:   Patient is well educated about her diet and since the diagnosis of diabetes a few weeks ago she has been working to minimize carbs and sugar where she can. The patient states she's implementing more greens and trying to decrease her rice and pasta weekly consumption.   Exercise  Patient states she has a goal to walk her dog 3 times a week for about 20-30 minutes. Once she has obtained this goal and can keep up this physical activity she will increase her exercise further    Monitoring  All questions and concerns addressed. Contact pharmacist with any further questions or concerns prior to next appointment.   Reinforced healthy diet, lifestyle, and exercise.  Prescription medication ordered.  Metformin XR 1000 once daily   Lab work ordered - lipid panel  Follow up in  3  week(s).    Clinical Pharmacist follow-up: in 3 weeks; Telehealth visit on Nov. 4, 2024 @ 4:30 PM

## 2024-10-14 NOTE — PATIENT INSTRUCTIONS
NAME: Adriana Rangel   DATE: 10/14/2024     Your Pharmacist Today: Eda Ramon, VivianaD  Your Primary Care Physician: Anshul Blanc MD   Your Referring Provider: Anshul Blanc MD    Thank you for your time today, Ms. Adriana Rangel. It was a pleasure managing your health together! Below is a summary of your treatment plan, other important information, and our pharmacy team's contact numbers:  If you need to schedule or re-schedule an appointment with us, please call our scheduling line at [phone], option 1.   To schedule non-pharmacy appointments please call [phone]  If you are out of medication refills or have a medical question, please contact me at my direct line, [phone]. Please allow for up to 48 hours for a return call. You can also contact me through [employer].    DIAGNOSIS:  Diabetes Mellitus Type II + Obesity      TREATMENT PLAN     Patient Goals  A1c: less than 7%;  Is pt at goal? No, but ideal goal of <6.5  BMI < 30  Decrease LDL-C (bad cholesterol ) by 30-50%  Improve diet and increase exercise    Medication Changes:    INCREASE Metformin to 1000 mg XR (take  mg tablets) once daily    Patient Instructions/Plan  Keep up the good work  Get blood work by 2024 - want to see cholesterol number   Increase exercise   Continue to improve diet   Ensure lifestyle changes are achievable and sustainable      Follow-up Appointment:   Follow-up with me in 3 weeks. Telehealth visit on Nov. 4, 2024 @ 4:30 PM    IMPORTANT INFORMATION     Please call 911 for medical emergencies.  Our pharmacy team is generally available from Monday-Friday, 8 am - 5 pm.  If you need to get in touch with me, you may either call me at my direct line or you can use [employer].  If you are unable to make your appointment, kindly call your our pharmacy scheduling line at [phone] at least 48 hours in advance to cancel and reschedule.  There are no supporting services by the pharmacy team on weekends and  holidays, or after 5 PM on weekdays.      RETAIL PHARMACY     [facility]    [address]  [place] [id number]  Phone: [phone]  Hours: M-F: 8 am - 6 pm;   Saturday: 8 am - 4 pm; Sunday 9 am - 1 pm

## 2024-10-14 NOTE — PROGRESS NOTES
Clinical Pharmacy Appointment    Patient ID: [id number]  Adriana Rangel is a 54 y.o. female who presents for First appointment. Reason for Referral: DM and Obesity Medication Management Referring Provider: Anshul Blanc MD    Associated Diagnoses:  E66.01,Z68.41 (ICD-10-CM) - Class 3 severe obesity due to excess calories with serious comorbidity and body mass index (BMI) of 40.0 to 44.9 in adult  E11.9 (ICD-10-CM) - Controlled type 2 diabetes mellitus without complication, without long-term current use of insulin (Multi)    PCP: Anshul Blanc MD   Last visit with PCP: 8/21/24   Next visit with PCP: 12/02/24    Subjective     PMH:   No past medical history on file. Below is from chart review  [place], multiple ADRs, hyperglycemia, Obesity, dyslipidemia, hx of hypokalemia, essential hypertension, migraine, Vitamin D deficiency, osteoarthritis, benign neoplasms breast and skin, chronic pain after a fall, intercostal neuralgia, hypothyroidism    Social Hx:  Never Smoked   Alcohol 0 drinks per week, rarely drinks  Drugs:  Denies illicit drug use, denies use of marijuana     Family Hx:  Mother: HTN, CVA    HPI:   Diabetes  She presents for her initial diabetic visit. She has type 2 diabetes mellitus. The initial diagnosis of diabetes was made 2 weeks ago. Her disease course has been stable. There are no hypoglycemic associated symptoms. Associated symptoms include fatigue, polyuria and weakness. Pertinent negatives for diabetes include no chest pain, no foot paresthesias, no foot ulcerations and no visual change. There are no hypoglycemic complications. Symptoms are stable. There are no diabetic complications. Risk factors for coronary artery disease include dyslipidemia, family history, obesity, hypertension, diabetes mellitus and sedentary lifestyle. Current diabetic treatment includes diet and oral agent (monotherapy). She is compliant with treatment some of the time. She is following a high  fat/cholesterol and generally healthy diet. Meal planning includes avoidance of concentrated sweets. She has not had a previous visit with a dietitian. She participates in exercise intermittently. There is no change in her home blood glucose trend. An ACE inhibitor/angiotensin II receptor blocker is being taken. She does not see a podiatrist.Eye exam is current.     _____________________________________________________________________________    DIABETES MELLITUS type 2 & Obesity   _____________________________________________________________________________    Diagnosed with diabetes:  2024. Known diabetic complications: none  Does patient follow with Endocrinology: No  Last optometry exam: < 1 year, sees an eye doctor  Most recent visit in Podiatry: No, recent diagnoses - was educated on peripheral neuropathy as a complication of diabetes and the recommendation to have an annual visit to podiatry   **-- patient denies sores or cuts on feet today      Current diabetic medications include:  Metformin  mg once daily on it ~2 weeks     Clarifications to above regimen:   Has NOT started semaglutide due to insurance issues. Patient was unable to say what the issue was with the medication, but stated that [facility] called her and said to call them back. Advised the patient to call the pharmacy, but also gave my best theory that it required a PA. I attempted to run a test claim through our EMR, but it stated that the information was incorrect. I verified the information x2 times with the patient (she had her card in her hand), but it was unable to give an approximation of cost. Potentially, it is because [facility] is not contracted with her insurance.   Adverse Effects: Patient denies any GI upset     Past diabetic medications include:  None    Glucose Readings:   Patient does not take her blood sugar at home nor has she ever done this in the past - if home BG readings become neccesary, the patient would require  proper education  Patient does not have a glucometer at home  HYPOGLYCEMIA  Any episodes of hypoglycemia? N/A.  Does not take sugars at home  Did patient treat episode of hypoglycemia appropriately? N/A Patient is very low risk for hypoglycemia while on metformin monotherapy   Does the patient have a prescription for ready-to-use Glucagon? Not on insulin    Does pt have proteinuria? No    BMI: >/= 35? Yes, 45    Lifestyle:  Diet: 3 meals/day  BK: 2 scrambled eggs with olive oil or butter, cream of wheat, oatmeal packets (rarely) [employer]ist oats flavored oatmeal variety pack  LN: Salad (rarely with croutons does not use much dressing), or soup. Since the new DM diagnoses she states she is trying to stop eating sandwiches and bread in general  DN: Beef, potatoes are minimized, broccoli as a side, rice and or pasta ~3 days a week  Snacks: no longer snacking as she was told that she was not supposed snack anymore, but has on occasion ate celery and carrots, broccoli, or peanuts    Drinks: is only drinking water now, if she wants a flavored beverage she will have an iced tea with no sugar added (~3x a week)  good compliance with carbohydrate counting, high fat food choices, and is trying to change her lifestyle habits. The patient exhibits a high level of understanding and knowledge regarding a diabetic diet and the benefits of increased exercise.   Physical Activity: Attempting to increase walking, walking dog (great domi) - 3x a week for 20-30 minutes,  usually     Secondary Prevention:  Statin? No   The 10-year ASCVD risk score (Yeimi DK, et al., 2019) is: 4.7%    Values used to calculate the score:      Age: 54 years      Sex: Female      Is Non- : No      Diabetic: Yes      Tobacco smoker: No      Systolic Blood Pressure: 110 mmHg      Is BP treated: Yes      HDL Cholesterol: 43.8 mg/dL      Total Cholesterol: 202 mg/dL  ACE-I/ARB? Yes  Aspirin? Uses it for headaches on occasion. States  she used to take acetaminophen but as her mom lives with her and her mom was instructed not to take it, the patient stopped buying it. Educated on acetaminophen being the preferred medication    Pertinent PMH Review:  PMH of Pancreatitis: No  PMH of Retinopathy: No  PMH of Urinary Tract Infections: Yes, prior but no longer ~1 year ago  PMH of MTC: No  HF: No  _____________________________________________________________________________    Allergies   Reviewed? Yes  Changes? No    Medication Reconciliation:  OTCs?   Added - Aspirin if has a headache, acetaminophen    Drug Interactions  The following drug interactions were noted: aspirin and HTN    Medication System Management  Patient's preferred pharmacy: [facility]  Adherence/Medication Access:   Have you had any missed doses? Yes has not started ozempic. Denies missed doses of scheduled medication   _____________________________________________________________________________    Labs  Hepatic dysfunction?  No, LFTs WNL  Kidney dysfunction?  No; GFR: >60  Does pt have proteinuria? Not at this time  Last BMP? 8/6/24; Last A1c: 6.9; Date 8/6/24  _____________________________________________________________________________    Objective   Allergies   Allergen Reactions    Erythromycin Base Other    Lisinopril Unknown    Sulfa (Sulfonamide Antibiotics) Unknown    Latex Rash     Social History     Social History Narrative    Not on file      Medication Review  Current Outpatient Medications   Medication Instructions    amLODIPine (NORVASC) 5 mg, oral, Daily    ascorbic acid (VITAMIN C) 500 mg, oral, Daily    calcium carbonate/vitamin D3 (CALCIUM 500 + D, D3, ORAL) oral    chlorthalidone (Hygroton) 25 mg tablet TAKE 1 TABLET BY MOUTH EVERY DAY    cholecalciferol (VITAMIN D-3) 50 mcg, oral, Daily    DM/acetaminophen/doxylamine (CORICIDIN HBP COLD-MULTI SYMPT ORAL) oral    levothyroxine (SYNTHROID, LEVOXYL) 50 mcg, oral, Daily    losartan (Cozaar) 50 mg tablet TAKE 1 TABLET BY  "MOUTH EVERY DAY    melatonin 5 mg capsule oral    metFORMIN XR (GLUCOPHAGE-XR) 500 mg, oral, Daily with breakfast, Do not crush, chew, or split.    mv-calcium-min-iron fm-FA-vitK (Multi For Her) 18 mg iron-600 mcg-80 mcg tablet 1 tablet, oral, Daily    saccharomyces boulardii (FLORASTOR) 250 mg, oral, Daily    semaglutide 0.25 mg, subcutaneous, Weekly    spironolactone (ALDACTONE) 25 mg, oral, Daily      Vitals  BP Readings from Last 2 Encounters:   08/21/24 110/62   02/22/24 112/64     Labs  A1C  Lab Results   Component Value Date    HGBA1C 6.9 (H) 08/06/2024    HGBA1C 6.2 08/16/2023    HGBA1C 5.9 (A) 04/27/2023     BMP  Lab Results   Component Value Date    CALCIUM 9.4 08/06/2024     08/06/2024    K 3.8 08/06/2024    CO2 31 08/06/2024    CL 99 08/06/2024    BUN 19 08/06/2024    CREATININE 0.94 08/06/2024    EGFR 72 08/06/2024     LFTs  Lab Results   Component Value Date    ALT 23 08/06/2024    AST 16 08/06/2024    ALKPHOS 87 08/06/2024    BILITOT 0.5 08/06/2024     FLP  Lab Results   Component Value Date    TRIG 112 08/06/2024    CHOL 202 (H) 08/06/2024    LDLF 117 (H) 04/27/2023    LDLCALC 136 (H) 08/06/2024    HDL 43.8 08/06/2024     Urine Microalbumin  No results found for: \"MICROALBCREA\"  Weight Management  Wt Readings from Last 3 Encounters:   09/09/24 119 kg (262 lb 5.6 oz)   08/21/24 119 kg (262 lb)   02/22/24 116 kg (256 lb)      There is no height or weight on file to calculate BMI.   _____________________________________________________________________________    Assessment/Plan   Problem List Items Addressed This Visit      _____________________________________________________________________________    DIABETES MELLITUS TYPE 2 & OBESITY PLAN:  ____________________________________________________________________________    Patient Goals  A1c: less than 7%;  Is pt at goal? No, but ideal goal of <6.5  BMI < 30  Decrease LDL-C by 30-50%  Improve diet and increase exercise    Diabetes   The patient " has just been diagnosed with type 2 diabetes and has only been taking 500mg of metformin for x2 weeks. Her diabetes is new and it cannot be determined if it is stable or improving. Her A1c has worsened over the past few years.   Rationale: Increase metformin.   Candidate for GLP? Yes; Medication not started yet due to insurance delays (prior authorization)  Candidate for SGLT2? Potentially in the future, due to her history of UTIs, this may not be the ideal choice; but may be an effective add on therapy to a GLP and metformin  Candidate for DPP4? No, not if the patient starts a GLP-1  Candidate for Metformin? Yes; was started at 500 mg, patient denied symptoms and has sufficient renal function.   Candidate for Insulin? No; A1c >/= 11? No  Medication Changes:  START   Semaglutide (Ozempic) injection - inject 0.25 mg under the skin once a week for 4 weeks   Once medication is approved through insurance, semaglutide (generic name) ozempic (brand name) will be started, but only AFTER you have either a phone, virtual, or in person appointment to go over the following information with your pharmacist    How to use the medication, proper storage, potential side effects, administration, benefits of this medication, and titration/schedule of doses  Completed Today  Discussed general issues about diabetes pathophysiology and management.  Counseling at today's visit: focused on the need for regular aerobic exercise and discussed the advantages of a diet low in carbohydrates.  Encouraged aerobic exercise.  Discussed foot care.  Reminded to get yearly retinal exam.  Increased dose of metformin; see  medication orders.  Labs: fasting blood sugar, fasting lipid panel, hemoglobin A1C, microalbuminuria, and CBC in 3 months. DM2 diagnosed ~2 weeks ago, last A1c at end of August - Next A1c due by end of 2024    Obesity   Current regimen includes metformin and will start semaglutide when insurance issues are resolved. Patient's current  weight reported as 262 lbs. Has lost 0 lbs (0% of TBW) and has not started therapy plan, but will once approved. Will send a PA to insurance company via cover my meds and send in a script to [facility] pharmacy to see if they have more insight regarding insurance test billing and steps to resolve.   Medication Changes: none, start semaglutide/ozempic when insurance issues resolved and/or PA is approved.     Future Considerations:  Add statin once the patient has an updated lipid panel  Mounjaro would provide more effective weight loss over semaglutide    Education:  Purpose of this clinical pharmacist appointment, why she was referred to us by her PCP and how this is a different service than her pharmacist at [facility] where her scripts get sent  Prior authorization process - has script at [facility]  Diet:   Patient is well educated about her diet and since the diagnosis of diabetes a few weeks ago she has been working to minimize carbs and sugar where she can. The patient states she's implementing more greens and trying to decrease her rice and pasta weekly consumption.   Exercise  Patient states she has a goal to walk her dog 3 times a week for about 20-30 minutes. Once she has obtained this goal and can keep up this physical activity she will increase her exercise further    Monitoring  All questions and concerns addressed. Contact pharmacist with any further questions or concerns prior to next appointment.   Reinforced healthy diet, lifestyle, and exercise.  Prescription medication ordered.  Metformin XR 1000 once daily   Lab work ordered - lipid panel  Follow up in  3  week(s).    Clinical Pharmacist follow-up: in 3 weeks; Telehealth visit on Nov. 4, 2024 @ 4:30 PM  Upcoming Appointments:     Continue all meds under the continuation of care with the referring provider and clinical pharmacy team.    Thank you,  Eda Ramon, PharmD   Clinical Pharmacist Specialist: Primary Care   Phone: [phone]     Verbal consent to  manage patient's drug therapy was obtained from the patient. They were informed they may decline to participate or withdraw from participation in pharmacy services at any time

## 2024-10-15 PROCEDURE — RXMED WILLOW AMBULATORY MEDICATION CHARGE

## 2024-10-20 ENCOUNTER — PHARMACY VISIT (OUTPATIENT)
Dept: PHARMACY | Facility: CLINIC | Age: 54
End: 2024-10-20
Payer: COMMERCIAL

## 2024-10-27 DIAGNOSIS — I10 PRIMARY HYPERTENSION: ICD-10-CM

## 2024-10-29 RX ORDER — SPIRONOLACTONE 25 MG/1
25 TABLET ORAL DAILY
Qty: 30 TABLET | Refills: 3 | Status: SHIPPED | OUTPATIENT
Start: 2024-10-29

## 2024-11-04 ENCOUNTER — APPOINTMENT (OUTPATIENT)
Dept: PHARMACY | Facility: HOSPITAL | Age: 54
End: 2024-11-04
Payer: COMMERCIAL

## 2024-11-04 DIAGNOSIS — E11.9 TYPE 2 DIABETES MELLITUS WITHOUT COMPLICATION, WITHOUT LONG-TERM CURRENT USE OF INSULIN (MULTI): ICD-10-CM

## 2024-11-04 RX ORDER — ROSUVASTATIN CALCIUM 5 MG/1
5 TABLET, COATED ORAL DAILY
Qty: 30 TABLET | Refills: 2 | Status: CANCELLED | OUTPATIENT
Start: 2024-11-04 | End: 2025-02-02

## 2024-11-04 ASSESSMENT — ENCOUNTER SYMPTOMS
FATIGUE: 1
WEAKNESS: 1
VISUAL CHANGE: 0

## 2024-11-04 NOTE — Clinical Note
Diabetes  The patient has just been diagnosed with type 2 diabetes has taken 500mg of metformin for x2 weeks and has been taking 1g of metformin for 2 additional weeks. Her diabetes is new and it cannot be determined if it is stable or improving. Her A1c has worsened over the past few years.  Plan: Continue metformin and start ozempic 0.25 mg  Rationale:  · Candidate for GLP? Yes; Medication not started yet due to GI effects - patient reached out to PCP and was told to wait 2-3 weeks.  Future Considerations: · Depending on tolerability - could switch from ozempic to mounjaro · If patient continues to have ADRs with metformin can discontinue and focus on glp titration · Add statin

## 2024-11-04 NOTE — PATIENT INSTRUCTIONS
NAME: Adriana Rangel   DATE: 2024     Your Pharmacist Today: Eda Ramon, PharmD  Your Primary Care Physician: Anshul Blanc MD   Your Referring Provider: Anshul Blanc MD    Thank you for your time today, Ms. Adriana Rangel. It was a pleasure managing your health together! Below is a summary of your treatment plan, other important information, and our pharmacy team's contact numbers:  If you need to schedule or re-schedule an appointment with us, please call our scheduling line at [phone], option 1.   To schedule non-pharmacy appointments please call [phone]  If you are out of medication refills or have a medical question, please contact me at my direct line, [phone]. Please allow for up to 48 hours for a return call. You can also contact me through [employer].    DIAGNOSIS:  Type 2 Diabetes Mellitus   Weight Loss       TREATMENT PLAN     Medication Changes: none   Continue with current plan     Patient Goals  - Fasting B - 130 mg/dL  - Postprandial BG: less than 180 mg/dL  - A1c: less than 7%     Patient Instructions/Plan  Exercise daily   Start Ozempic   Continue metformin  Call if any GI upset   Eda Ramon, PharmD  Clinical Pharmacist Specialist, Primary Care   Phone: [phone]  COMPLETE LAB WORK PRIOR TO NEXT APPT     Follow-up Appointment:   Follow-up with me in 4 weeks on  @ 4:30 PM    Upcoming Appointments:   Ophthalmologist 24   PCP 24  Nutrition 25    IMPORTANT INFORMATION     Please call 911 for medical emergencies.  Our pharmacy team is generally available from Monday-Friday, 8 am - 5 pm.  If you need to get in touch with me, you may either call me at my direct line or you can use [employer].  If you are unable to make your appointment, kindly call your our pharmacy scheduling line at [phone] at least 48 hours in advance to cancel and reschedule.  There are no supporting services by the pharmacy team on weekends  and holidays, or after 5 PM on weekdays.      RETAIL PHARMACY     [facility]    [address]  [place] [id number]  Phone: [phone]  Hours: M-F: 8 am - 6 pm;   Saturday: 8 am - 4 pm; Sunday 9 am - 1 pm

## 2024-11-04 NOTE — PROGRESS NOTES
Clinical Pharmacy Appointment    Patient ID: [id number]  Adriana Rangel is a 54 y.o. female who presents for Follow Up appointment. Reason for Referral: DM and Obesity Medication Management Referring Provider: Anshul Blanc MD    Associated Diagnoses:  E66.01,Z68.41 (ICD-10-CM) - Class 3 severe obesity due to excess calories with serious comorbidity and body mass index (BMI) of 40.0 to 44.9 in adult  E11.9 (ICD-10-CM) - Controlled type 2 diabetes mellitus without complication, without long-term current use of insulin (Multi)    PCP: Anshul Blanc MD   Last visit with PCP: 8/21/24   Next visit with PCP: 12/02/24    Last Pharmacy Appt: 10/14  Her diabetes is new and it cannot be determined if it is stable or improving. Her A1c has worsened over the past few years  Medication changes  Increased metformin from 500 mg to 1000 mg  Semaglutide 0.25 mg   Patient Goals: Improve diet and increase exercise  Candidate for SGLT2? Potentially in the future, due to her history of UTIs, this may not be the ideal choice; but may be an effective add on therapy to a GLP and metformin    Subjective     PMH:   No past medical history on file. Below is from chart review  Hammertoe, multiple ADRs, hyperglycemia, Obesity, dyslipidemia, hx of hypokalemia, essential hypertension, migraine, Vitamin D deficiency, osteoarthritis, benign neoplasms breast and skin, chronic pain after a fall, intercostal neuralgia, hypothyroidism    Social Hx:  Never Smoked   Alcohol 0 drinks per week, rarely drinks  Drugs:  Denies illicit drug use, denies use of marijuana     Family Hx:  Mother: HTN, CVA    HPI:   Diabetes  She presents for her initial diabetic visit. She has type 2 diabetes mellitus. The initial diagnosis of diabetes was made 2 weeks ago. Her disease course has been stable. There are no hypoglycemic associated symptoms. Associated symptoms include fatigue, polyuria and weakness. Pertinent negatives for diabetes include no chest  pain, no foot paresthesias, no foot ulcerations and no visual change. There are no hypoglycemic complications. Symptoms are stable. There are no diabetic complications. Risk factors for coronary artery disease include dyslipidemia, family history, obesity, hypertension, diabetes mellitus and sedentary lifestyle. Current diabetic treatment includes diet and oral agent (monotherapy). She is compliant with treatment some of the time. She is following a high fat/cholesterol and generally healthy diet. Meal planning includes avoidance of concentrated sweets. She has not had a previous visit with a dietitian. She participates in exercise intermittently. There is no change in her home blood glucose trend. An ACE inhibitor/angiotensin II receptor blocker is being taken. She does not see a podiatrist.Eye exam is current.     _____________________________________________________________________________    DIABETES MELLITUS type 2 & Obesity   _____________________________________________________________________________    Diagnosed with diabetes:  2024. Known diabetic complications: none  Does patient follow with Endocrinology: No  Last optometry exam: < 1 year, sees an eye doctor  Most recent visit in Podiatry: No, recent diagnoses - was educated on peripheral neuropathy as a complication of diabetes and the recommendation to have an annual visit to podiatry   **-- patient denies sores or cuts on feet today, has noted increased soreness when on her feet for more than a few hours which is less time than normal for her, but has not been on feet as much in general. Unsure of cause     Current diabetic medications include:  Metformin XR 1g once daily    Clarifications to above regimen: not started ozempic  Coricidin  Adverse Effects:   Patient reached out to MD on 10/20/24 about nausea and diarrhea after being on increased dose of metformin   Picked up the perscription increasing Metfomin dose from 500 to 1000 taking dosage at  1000 since last Wednesday. I do feel more nausea and have diarrhea. Just picked up Ozempic today. Should I wait til my body gets used to the 1000 Metformin?   MD advised to wait 2-3 weeks to start Ozempic after ADRs of metformin subsided    Past diabetic medications include:  Metformin  mg once daily on it x 2 weeks     Glucose Readings:   Patient does not take her blood sugar at home nor has she ever done this in the past - if home BG readings become neccesary, the patient would require proper education  Patient does not have a glucometer at home    HYPOGLYCEMIA  Any episodes of hypoglycemia? N/A.  Does not take sugars at home  Did patient treat episode of hypoglycemia appropriately? N/A Patient is very low risk for hypoglycemia while on metformin monotherapy   Does the patient have a prescription for ready-to-use Glucagon? Not on insulin    Does pt have proteinuria? No    BMI: >/= 35? Yes, 45    Weight: 10/31: 250 lbs (last weight was 262 lbs)    Lifestyle:  Endorses decreased appetite   Diet: 3 meals/day  BK: 2 scrambled eggs with olive oil or butter, cream of wheat, oatmeal packets (rarely) Orthodox oats flavored oatmeal variety pack  LN: Salad (rarely with croutons does not use much dressing), or soup. Since the new DM diagnoses she states she is trying to stop eating sandwiches and bread in general  DN: Beef, potatoes are minimized, broccoli as a side, rice and or pasta ~3 days a week  Snacks: no longer snacking as she was told that she was not supposed snack anymore, but has on occasion ate celery and carrots, broccoli, or peanuts    Drinks: is only drinking water now, if she wants a flavored beverage she will have an iced tea with no sugar added (~3x a week)  good compliance with carbohydrate counting, high fat food choices, and is trying to change her lifestyle habits. The patient exhibits a high level of understanding and knowledge regarding a diabetic diet and the benefits of increased exercise.    Physical Activity: Attempting to increase walking, walking dog (great domi) - 3x a week for 20-30 minutes,  usually     Secondary Prevention:  Statin? No   The 10-year ASCVD risk score (Yeimi CHRISTIANSEN, et al., 2019) is: 4.7%    Values used to calculate the score:      Age: 54 years      Sex: Female      Is Non- : No      Diabetic: Yes      Tobacco smoker: No      Systolic Blood Pressure: 110 mmHg      Is BP treated: Yes      HDL Cholesterol: 43.8 mg/dL      Total Cholesterol: 202 mg/dL  ACE-I/ARB? Yes  Aspirin? Uses it for headaches on occasion. States she used to take acetaminophen but as her mom lives with her and her mom was instructed not to take it, the patient stopped buying it. Educated on acetaminophen being the preferred medication    Pertinent PMH Review:  PMH of Pancreatitis: No  PMH of Retinopathy: No  PMH of Urinary Tract Infections: Yes, prior but no longer ~1 year ago  PMH of MTC: No  HF: No  _____________________________________________________________________________    Allergies   Reviewed? Yes  Changes? No    Medication Reconciliation:  OTCs?   none    Drug Interactions  The following drug interactions were noted: aspirin and HTN    Medication System Management  [facility]'s [facility]  Adherence/Medication Access:   Have you had any missed doses? Yes has not started ozempic. Denies missed doses of scheduled medication   _____________________________________________________________________________    Labs  Hepatic dysfunction?  No, LFTs WNL  Kidney dysfunction?  No; GFR: >60  Does pt have proteinuria? Not at this time  Last BMP? 8/6/24; Last A1c: 6.9; Date 8/6/24  _____________________________________________________________________________    Objective   Allergies   Allergen Reactions    Erythromycin Base Hives    Lisinopril Cough    Sulfa (Sulfonamide Antibiotics) Hives    Latex Rash     Social History     Social History Narrative    Not on file       Medication Review  Current Outpatient Medications   Medication Instructions    amLODIPine (NORVASC) 5 mg, oral, Daily    ascorbic acid (VITAMIN C) 500 mg, oral, Daily    aspirin 325 mg, oral, Daily PRN    calcium carbonate/vitamin D3 (CALCIUM 500 + D, D3, ORAL) oral    chlorthalidone (Hygroton) 25 mg tablet TAKE 1 TABLET BY MOUTH EVERY DAY    cholecalciferol (VITAMIN D-3) 50 mcg, oral, Daily    guaifenesin/dextromethorphan (CORICIDIN HBP CHEST FILIPE-COUGH ORAL) 1 tablet, oral, As needed    levothyroxine (SYNTHROID, LEVOXYL) 50 mcg, oral, Daily    losartan (Cozaar) 50 mg tablet TAKE 1 TABLET BY MOUTH EVERY DAY    melatonin 5 mg, oral, Nightly PRN, Just as needed, patient states she does not use this often    metFORMIN XR (Glucophage-XR) 500 mg 24 hr tablet Take 2 tablets daily with breakfast. Do not crush, chew, or split.    mv-calcium-min-iron fm-FA-vitK (Multi For Her) 18 mg iron-600 mcg-80 mcg tablet 1 tablet, oral, Daily    Ozempic 0.25 mg, subcutaneous, Weekly    saccharomyces boulardii (FLORASTOR) 250 mg, oral, Daily    semaglutide 0.25 mg, subcutaneous, Weekly    spironolactone (ALDACTONE) 25 mg, oral, Daily      Vitals  BP Readings from Last 2 Encounters:   08/21/24 110/62   02/22/24 112/64     Labs  A1C  Lab Results   Component Value Date    HGBA1C 6.9 (H) 08/06/2024    HGBA1C 6.2 08/16/2023    HGBA1C 5.9 (A) 04/27/2023     BMP  Lab Results   Component Value Date    CALCIUM 9.4 08/06/2024     08/06/2024    K 3.8 08/06/2024    CO2 31 08/06/2024    CL 99 08/06/2024    BUN 19 08/06/2024    CREATININE 0.94 08/06/2024    EGFR 72 08/06/2024     LFTs  Lab Results   Component Value Date    ALT 23 08/06/2024    AST 16 08/06/2024    ALKPHOS 87 08/06/2024    BILITOT 0.5 08/06/2024     FLP  Lab Results   Component Value Date    TRIG 112 08/06/2024    CHOL 202 (H) 08/06/2024    LDLF 117 (H) 04/27/2023    LDLCALC 136 (H) 08/06/2024    HDL 43.8 08/06/2024     Urine Microalbumin  No results found for:  "\"MICROALBCREA\"  Weight Management  Wt Readings from Last 3 Encounters:   09/09/24 119 kg (262 lb 5.6 oz)   08/21/24 119 kg (262 lb)   02/22/24 116 kg (256 lb)      There is no height or weight on file to calculate BMI.   _____________________________________________________________________________    Assessment/Plan   Problem List Items Addressed This Visit      _____________________________________________________________________________    DIABETES MELLITUS TYPE 2 & OBESITY PLAN:  ____________________________________________________________________________    Patient Goals  A1c: less than 7%;  Is pt at goal? No, but ideal goal of <6.5  BMI < 30  Decrease LDL-C by 30-50%  Improve diet and increase exercise    Diabetes   The patient has just been diagnosed with type 2 diabetes has taken 500mg of metformin for x2 weeks and has been taking 1g of metformin for 2 additional weeks. Her diabetes is new and it cannot be determined if it is stable or improving. Her A1c has worsened over the past few years.   Plan: Continue metformin and start ozempic 0.25 mg   Rationale:   Candidate for GLP? Yes; Medication not started yet due to GI effects - patient reached out to PCP and was told to wait 2-3 weeks.   Candidate for SGLT2? Potentially in the future, due to her history of UTIs, this may not be the ideal choice; but may be an effective add on therapy to a GLP and metformin  Candidate for DPP4? No, not if the patient starts a GLP-1  Candidate for Metformin? Yes; was started at 500 mg, patient denied symptoms and has sufficient renal function.   Candidate for Insulin? No; A1c >/= 11? No  Medication Changes: None  Completed Today  Discussed general issues about diabetes pathophysiology and management.  Counseling at today's visit: focused on the need for regular aerobic exercise and discussed the advantages of a diet low in carbohydrates.  Encouraged aerobic exercise.  Discussed foot care.  Reminded to get yearly retinal " exam.  Increased dose of metformin; see  medication orders.  Labs: fasting blood sugar, fasting lipid panel, hemoglobin A1C, microalbuminuria, and CBC in 3 months. DM2 diagnosed ~2 weeks ago, last A1c at end of August - Next A1c due by end of 2024    Obesity   Current regimen includes metformin and will start semaglutide in one week as directed by PCP Patient's current weight reported as 250 lbs. Has lost 12 lbs and has not started therapy plan, but will in one week.     Medication Changes: Start semaglutide as in the above diabetes plan    Future Considerations:  Depending on tolerability - could switch from ozempic to mounjaro  If patient continues to have ADRs with metformin can discontinue and focus on glp titration  Add statin     Education:  Diet:   Patient is well educated about her diet and since the diagnosis of diabetes a few weeks ago she has been working to minimize carbs and sugar where she can. The patient states she's implementing more greens and trying to decrease her rice and pasta weekly consumption.   Exercise  Patient states she has a goal to walk her dog 3 times a week for about 20-30 minutes. Once she has obtained this goal and can keep up this physical activity she will increase her exercise further    Monitoring  All questions and concerns addressed. Contact pharmacist with any further questions or concerns prior to next appointment.   Reinforced healthy diet, lifestyle, and exercise.  Lab work ordered - complete by next appt  Follow up in  4  week(s).    Clinical Pharmacist follow-up: in 4 weeks; Telehealth visit on Thursday December 5th, 2024 @ 4:30 PM  Upcoming Appointments:   Ophthalmologist 11/22/24   PCP 12/2/24  Nutrition 1/17/25    Continue all meds under the continuation of care with the referring provider and clinical pharmacy team.    Thank you,    Eda Ramon, PharmD   Clinical Pharmacist Specialist, Primary Care   Phone: [phone]   Fax: [phone]   Email:  [email or web address]    Verbal consent to manage patient's drug therapy was obtained from the patient. They were informed they may decline to participate or withdraw from participation in pharmacy services at any time

## 2024-11-05 NOTE — ASSESSMENT & PLAN NOTE
_____________________________________________________________________________    DIABETES MELLITUS TYPE 2 & OBESITY PLAN:  ____________________________________________________________________________    Patient Goals  A1c: less than 7%;  Is pt at goal? No, but ideal goal of <6.5  BMI < 30  Decrease LDL-C by 30-50%  Improve diet and increase exercise    Diabetes   The patient has just been diagnosed with type 2 diabetes has taken 500mg of metformin for x2 weeks and has been taking 1g of metformin for 2 additional weeks. Her diabetes is new and it cannot be determined if it is stable or improving. Her A1c has worsened over the past few years.   Plan: Continue metformin and start ozempic 0.25 mg   Rationale:   Candidate for GLP? Yes; Medication not started yet due to GI effects - patient reached out to PCP and was told to wait 2-3 weeks.   Candidate for SGLT2? Potentially in the future, due to her history of UTIs, this may not be the ideal choice; but may be an effective add on therapy to a GLP and metformin  Candidate for DPP4? No, not if the patient starts a GLP-1  Candidate for Metformin? Yes; was started at 500 mg, patient denied symptoms and has sufficient renal function.   Candidate for Insulin? No; A1c >/= 11? No  Medication Changes: None  Completed Today  Discussed general issues about diabetes pathophysiology and management.  Counseling at today's visit: focused on the need for regular aerobic exercise and discussed the advantages of a diet low in carbohydrates.  Encouraged aerobic exercise.  Discussed foot care.  Reminded to get yearly retinal exam.  Increased dose of metformin; see  medication orders.  Labs: fasting blood sugar, fasting lipid panel, hemoglobin A1C, microalbuminuria, and CBC in 3 months. DM2 diagnosed ~2 weeks ago, last A1c at end of August - Next A1c due by end of 2024    Obesity   Current regimen includes metformin and will start semaglutide in one week as directed by PCP Patient's  current weight reported as 250 lbs. Has lost 12 lbs and has not started therapy plan, but will in one week.     Medication Changes: Start semaglutide as in the above diabetes plan    Future Considerations:  Depending on tolerability - could switch from ozempic to mounjaro  If patient continues to have ADRs with metformin can discontinue and focus on glp titration  Add statin     Education:  Diet:   Patient is well educated about her diet and since the diagnosis of diabetes a few weeks ago she has been working to minimize carbs and sugar where she can. The patient states she's implementing more greens and trying to decrease her rice and pasta weekly consumption.   Exercise  Patient states she has a goal to walk her dog 3 times a week for about 20-30 minutes. Once she has obtained this goal and can keep up this physical activity she will increase her exercise further    Monitoring  All questions and concerns addressed. Contact pharmacist with any further questions or concerns prior to next appointment.   Reinforced healthy diet, lifestyle, and exercise.  Lab work ordered - complete by next appt  Follow up in  4  week(s).    Clinical Pharmacist follow-up: in 4 weeks; Telehealth visit on Tuesday December 5th, 2024 @ 4:30 PM  Upcoming Appointments:   Ophthalmologist 11/22/24   PCP 12/2/24  Nutrition 1/17/25

## 2024-11-20 DIAGNOSIS — I10 PRIMARY HYPERTENSION: Primary | ICD-10-CM

## 2024-11-20 RX ORDER — AMLODIPINE BESYLATE 5 MG/1
5 TABLET ORAL DAILY
Qty: 90 TABLET | Refills: 1 | Status: SHIPPED | OUTPATIENT
Start: 2024-11-20

## 2024-11-22 ENCOUNTER — APPOINTMENT (OUTPATIENT)
Dept: OPHTHALMOLOGY | Facility: CLINIC | Age: 54
End: 2024-11-22
Payer: COMMERCIAL

## 2024-11-22 DIAGNOSIS — H52.12 MYOPIA OF LEFT EYE: ICD-10-CM

## 2024-11-22 DIAGNOSIS — H52.223 REGULAR ASTIGMATISM OF BOTH EYES: ICD-10-CM

## 2024-11-22 DIAGNOSIS — H52.4 PRESBYOPIA: ICD-10-CM

## 2024-11-22 DIAGNOSIS — E11.9 TYPE 2 DIABETES MELLITUS WITHOUT COMPLICATION, WITHOUT LONG-TERM CURRENT USE OF INSULIN (MULTI): Primary | ICD-10-CM

## 2024-11-22 DIAGNOSIS — H52.01 HYPERMETROPIA OF RIGHT EYE: ICD-10-CM

## 2024-11-22 PROCEDURE — 92015 DETERMINE REFRACTIVE STATE: CPT | Performed by: OPTOMETRIST

## 2024-11-22 PROCEDURE — 99204 OFFICE O/P NEW MOD 45 MIN: CPT | Performed by: OPTOMETRIST

## 2024-11-22 RX ORDER — SEMAGLUTIDE 1.34 MG/ML
INJECTION, SOLUTION SUBCUTANEOUS WEEKLY
COMMUNITY

## 2024-11-22 ASSESSMENT — TONOMETRY
OS_IOP_MMHG: 15
OD_IOP_MMHG: 16
IOP_METHOD: GOLDMANN APPLANATION

## 2024-11-22 ASSESSMENT — CONF VISUAL FIELD
OD_NORMAL: 1
METHOD: COUNTING FINGERS
OS_INFERIOR_NASAL_RESTRICTION: 0
OD_INFERIOR_NASAL_RESTRICTION: 0
OS_INFERIOR_TEMPORAL_RESTRICTION: 0
OS_NORMAL: 1
OD_SUPERIOR_NASAL_RESTRICTION: 0
OD_INFERIOR_TEMPORAL_RESTRICTION: 0
OS_SUPERIOR_NASAL_RESTRICTION: 0
OS_SUPERIOR_TEMPORAL_RESTRICTION: 0
OD_SUPERIOR_TEMPORAL_RESTRICTION: 0

## 2024-11-22 ASSESSMENT — REFRACTION_MANIFEST
OS_AXIS: 100
OS_ADD: +2.00
OS_CYLINDER: -0.75
OD_CYLINDER: -2.00
OD_AXIS: 050
OD_SPHERE: +1.00
OS_SPHERE: -0.50
OD_ADD: +2.00

## 2024-11-22 ASSESSMENT — REFRACTION_WEARINGRX
OS_ADD: +2.00
OS_AXIS: 096
OD_AXIS: 050
OS_CYLINDER: -1.50
OD_ADD: +2.00
OD_SPHERE: +1.50
OS_SPHERE: -0.25
OD_CYLINDER: -1.75

## 2024-11-22 ASSESSMENT — ENCOUNTER SYMPTOMS
MUSCULOSKELETAL NEGATIVE: 0
GASTROINTESTINAL NEGATIVE: 0
ALLERGIC/IMMUNOLOGIC NEGATIVE: 0
ENDOCRINE NEGATIVE: 0
EYES NEGATIVE: 0
PSYCHIATRIC NEGATIVE: 0
RESPIRATORY NEGATIVE: 0
NEUROLOGICAL NEGATIVE: 0
CONSTITUTIONAL NEGATIVE: 0
HEMATOLOGIC/LYMPHATIC NEGATIVE: 0
CARDIOVASCULAR NEGATIVE: 0

## 2024-11-22 ASSESSMENT — VISUAL ACUITY
OD_CC: 20/20
OS_CC: 20/40
OS_PH_CC: 20/20
METHOD: SNELLEN - LINEAR

## 2024-11-22 ASSESSMENT — SLIT LAMP EXAM - LIDS
COMMENTS: NORMAL
COMMENTS: NORMAL

## 2024-11-22 ASSESSMENT — CUP TO DISC RATIO
OS_RATIO: 0.35
OD_RATIO: 0.35

## 2024-11-22 ASSESSMENT — EXTERNAL EXAM - RIGHT EYE: OD_EXAM: NORMAL

## 2024-11-22 ASSESSMENT — REFRACTION
OD_CYLINDER: -2.00
OD_ADD: +2.00
OS_CYLINDER: -1.00
OS_ADD: +2.00
OD_SPHERE: +1.50
OS_SPHERE: -0.25
OS_AXIS: 100
OD_AXIS: 050

## 2024-11-22 ASSESSMENT — EXTERNAL EXAM - LEFT EYE: OS_EXAM: NORMAL

## 2024-11-22 NOTE — PROGRESS NOTES
Assessment/Plan   Diagnoses and all orders for this visit:  Type 2 diabetes mellitus without complication, without long-term current use of insulin (Multi)  The patient has diabetes without any evidence of retinopathy.  The patient was advised to maintain tight glucose control, tight blood pressure control, and favorable levels of cholesterol, low density lipoprotein, and high density lipoproteins.  Follow up in one year was recommended.    Hypermetropia of right eye  Myopia of left eye  Regular astigmatism of both eyes  Presbyopia  New spec rx released today per patient request. Ocular health wnl for age OU. Monitor 1 year or sooner prn. Refraction billed today. Pt consents to receiving glasses Rx today. Patient's/guardian's signature obtained to acknowledge and confirm that a paper copy of glasses Rx was given to patient in compliance with [employer] Eyeglass Rule. Electronic copy of Rx will also be available via [employer].  Current Rx is over corrected in cylinder (cyl) OS. Binocular blur balanced Rx.

## 2024-11-24 DIAGNOSIS — I10 ESSENTIAL (PRIMARY) HYPERTENSION: Primary | ICD-10-CM

## 2024-11-25 RX ORDER — LOSARTAN POTASSIUM 50 MG/1
TABLET ORAL
Qty: 30 TABLET | Refills: 2 | Status: SHIPPED | OUTPATIENT
Start: 2024-11-25

## 2024-12-02 ENCOUNTER — LAB (OUTPATIENT)
Dept: LAB | Facility: LAB | Age: 54
End: 2024-12-02
Payer: COMMERCIAL

## 2024-12-02 ENCOUNTER — APPOINTMENT (OUTPATIENT)
Dept: PRIMARY CARE | Facility: CLINIC | Age: 54
End: 2024-12-02
Payer: COMMERCIAL

## 2024-12-02 VITALS
TEMPERATURE: 97.1 F | RESPIRATION RATE: 18 BRPM | WEIGHT: 245.8 LBS | HEIGHT: 64 IN | DIASTOLIC BLOOD PRESSURE: 68 MMHG | OXYGEN SATURATION: 95 % | SYSTOLIC BLOOD PRESSURE: 110 MMHG | BODY MASS INDEX: 41.96 KG/M2 | HEART RATE: 86 BPM

## 2024-12-02 DIAGNOSIS — E66.813 CLASS 3 SEVERE OBESITY DUE TO EXCESS CALORIES WITH SERIOUS COMORBIDITY AND BODY MASS INDEX (BMI) OF 40.0 TO 44.9 IN ADULT: ICD-10-CM

## 2024-12-02 DIAGNOSIS — I10 PRIMARY HYPERTENSION: ICD-10-CM

## 2024-12-02 DIAGNOSIS — E11.9 TYPE 2 DIABETES MELLITUS WITHOUT COMPLICATION, WITHOUT LONG-TERM CURRENT USE OF INSULIN (MULTI): ICD-10-CM

## 2024-12-02 DIAGNOSIS — E66.01 CLASS 3 SEVERE OBESITY DUE TO EXCESS CALORIES WITH SERIOUS COMORBIDITY AND BODY MASS INDEX (BMI) OF 40.0 TO 44.9 IN ADULT: ICD-10-CM

## 2024-12-02 DIAGNOSIS — E78.5 DYSLIPIDEMIA: ICD-10-CM

## 2024-12-02 DIAGNOSIS — E11.9 CONTROLLED TYPE 2 DIABETES MELLITUS WITHOUT COMPLICATION, WITHOUT LONG-TERM CURRENT USE OF INSULIN (MULTI): Primary | ICD-10-CM

## 2024-12-02 LAB
ALBUMIN SERPL BCP-MCNC: 4.6 G/DL (ref 3.4–5)
ALP SERPL-CCNC: 86 U/L (ref 33–110)
ALT SERPL W P-5'-P-CCNC: 38 U/L (ref 7–45)
ANION GAP SERPL CALC-SCNC: 13 MMOL/L (ref 10–20)
AST SERPL W P-5'-P-CCNC: 23 U/L (ref 9–39)
BILIRUB SERPL-MCNC: 0.7 MG/DL (ref 0–1.2)
BUN SERPL-MCNC: 21 MG/DL (ref 6–23)
CALCIUM SERPL-MCNC: 9.9 MG/DL (ref 8.6–10.3)
CHLORIDE SERPL-SCNC: 97 MMOL/L (ref 98–107)
CHOLEST SERPL-MCNC: 195 MG/DL (ref 0–199)
CHOLESTEROL/HDL RATIO: 4.8
CO2 SERPL-SCNC: 31 MMOL/L (ref 21–32)
CREAT SERPL-MCNC: 1.26 MG/DL (ref 0.5–1.05)
EGFRCR SERPLBLD CKD-EPI 2021: 51 ML/MIN/1.73M*2
EST. AVERAGE GLUCOSE BLD GHB EST-MCNC: 131 MG/DL
GLUCOSE SERPL-MCNC: 141 MG/DL (ref 74–99)
HBA1C MFR BLD: 6.2 %
HDLC SERPL-MCNC: 41 MG/DL
LDLC SERPL CALC-MCNC: 124 MG/DL
NON HDL CHOLESTEROL: 154 MG/DL (ref 0–149)
POC FINGERSTICK BLOOD GLUCOSE: 109 MG/DL (ref 70–100)
POTASSIUM SERPL-SCNC: 3.5 MMOL/L (ref 3.5–5.3)
PROT SERPL-MCNC: 6.9 G/DL (ref 6.4–8.2)
SODIUM SERPL-SCNC: 137 MMOL/L (ref 136–145)
TRIGL SERPL-MCNC: 148 MG/DL (ref 0–149)
VLDL: 30 MG/DL (ref 0–40)

## 2024-12-02 PROCEDURE — 80061 LIPID PANEL: CPT

## 2024-12-02 PROCEDURE — 3049F LDL-C 100-129 MG/DL: CPT | Performed by: INTERNAL MEDICINE

## 2024-12-02 PROCEDURE — 80053 COMPREHEN METABOLIC PANEL: CPT

## 2024-12-02 PROCEDURE — 3008F BODY MASS INDEX DOCD: CPT | Performed by: INTERNAL MEDICINE

## 2024-12-02 PROCEDURE — 99214 OFFICE O/P EST MOD 30 MIN: CPT | Performed by: INTERNAL MEDICINE

## 2024-12-02 PROCEDURE — 3044F HG A1C LEVEL LT 7.0%: CPT | Performed by: INTERNAL MEDICINE

## 2024-12-02 PROCEDURE — 36415 COLL VENOUS BLD VENIPUNCTURE: CPT

## 2024-12-02 PROCEDURE — 4010F ACE/ARB THERAPY RXD/TAKEN: CPT | Performed by: INTERNAL MEDICINE

## 2024-12-02 PROCEDURE — 83036 HEMOGLOBIN GLYCOSYLATED A1C: CPT

## 2024-12-02 PROCEDURE — 1036F TOBACCO NON-USER: CPT | Performed by: INTERNAL MEDICINE

## 2024-12-02 PROCEDURE — 3078F DIAST BP <80 MM HG: CPT | Performed by: INTERNAL MEDICINE

## 2024-12-02 PROCEDURE — 82962 GLUCOSE BLOOD TEST: CPT | Performed by: INTERNAL MEDICINE

## 2024-12-02 PROCEDURE — 3074F SYST BP LT 130 MM HG: CPT | Performed by: INTERNAL MEDICINE

## 2024-12-02 RX ORDER — ROSUVASTATIN CALCIUM 5 MG/1
5 TABLET, COATED ORAL DAILY
Qty: 100 TABLET | Refills: 3 | Status: SHIPPED | OUTPATIENT
Start: 2024-12-02 | End: 2026-01-06

## 2024-12-02 RX ORDER — METFORMIN HYDROCHLORIDE 500 MG/1
500 TABLET, EXTENDED RELEASE ORAL
Qty: 30 TABLET | Refills: 11 | Status: SHIPPED | OUTPATIENT
Start: 2024-12-02

## 2024-12-02 ASSESSMENT — ENCOUNTER SYMPTOMS
CHEST TIGHTNESS: 0
FATIGUE: 0
CONSTIPATION: 0
VOMITING: 0
DIARRHEA: 0
SHORTNESS OF BREATH: 0
DYSURIA: 0
ARTHRALGIAS: 0
NAUSEA: 0
DIZZINESS: 0
WEAKNESS: 0
ABDOMINAL PAIN: 0
ADENOPATHY: 0
UNEXPECTED WEIGHT CHANGE: 0
PALPITATIONS: 0
JOINT SWELLING: 0
CHILLS: 0
WHEEZING: 0
CONFUSION: 0
SORE THROAT: 0
SLEEP DISTURBANCE: 0
COUGH: 0

## 2024-12-02 ASSESSMENT — PATIENT HEALTH QUESTIONNAIRE - PHQ9
1. LITTLE INTEREST OR PLEASURE IN DOING THINGS: NOT AT ALL
SUM OF ALL RESPONSES TO PHQ9 QUESTIONS 1 AND 2: 0
2. FEELING DOWN, DEPRESSED OR HOPELESS: NOT AT ALL

## 2024-12-02 NOTE — PROGRESS NOTES
Subjective   Adriana Rangel is a 54 y.o. female who presents for Follow-up (3 months follow up DM).  3 months follow up DM ,last HGBA1C was today 12.2.2024  and is 6.2   Mammogram- 9.2024  Labs - 12.2024   Not checking BG at home   Patient had an eye exam 11.22.24 and was told  everything OK   BG  today in office 109   Losing weight, she cut the portion 1/2  compare with  before,since on metformin , not that  hungry/per patient statement       Review of Systems   Constitutional:  Negative for chills, fatigue and unexpected weight change.        Comment   HENT:  Negative for congestion, ear pain and sore throat.    Respiratory:  Negative for cough, chest tightness, shortness of breath and wheezing.    Cardiovascular:  Negative for palpitations and leg swelling.   Gastrointestinal:  Negative for abdominal pain, constipation, diarrhea, nausea and vomiting.   Genitourinary:  Negative for dysuria and urgency.   Musculoskeletal:  Negative for arthralgias and joint swelling.   Skin:  Negative for rash.   Neurological:  Negative for dizziness and weakness.   Hematological:  Negative for adenopathy.   Psychiatric/Behavioral:  Negative for confusion and sleep disturbance.        Objective   Physical Exam  Constitutional:       Appearance: Normal appearance.      Comments: obese   HENT:      Head: Normocephalic and atraumatic.   Eyes:      Pupils: Pupils are equal, round, and reactive to light.   Cardiovascular:      Rate and Rhythm: Normal rate and regular rhythm.   Pulmonary:      Effort: Pulmonary effort is normal.      Breath sounds: Normal breath sounds.   Musculoskeletal:         General: Normal range of motion.      Cervical back: Normal range of motion and neck supple.   Skin:     General: Skin is warm.   Neurological:      General: No focal deficit present.      Mental Status: She is alert and oriented to person, place, and time.   Psychiatric:         Mood and Affect: Mood normal.         Behavior: Behavior normal.  "      /68 (BP Location: Left arm, Patient Position: Sitting)   Pulse 86   Temp 36.2 °C (97.1 °F)   Resp 18   Ht 1.626 m (5' 4\")   Wt 111 kg (245 lb 12.8 oz)   SpO2 95%   BMI 42.19 kg/m²     Chemistry    Lab Results   Component Value Date/Time     12/02/2024 0706    K 3.5 12/02/2024 0706    CL 97 (L) 12/02/2024 0706    CO2 31 12/02/2024 0706    BUN 21 12/02/2024 0706    CREATININE 1.26 (H) 12/02/2024 0706    Lab Results   Component Value Date/Time    CALCIUM 9.9 12/02/2024 0706    ALKPHOS 86 12/02/2024 0706    AST 23 12/02/2024 0706    ALT 38 12/02/2024 0706    BILITOT 0.7 12/02/2024 0706        Lab Results   Component Value Date    CHOL 195 12/02/2024    CHOL 202 (H) 08/06/2024    CHOL 178 04/27/2023     Lab Results   Component Value Date    HDL 41.0 12/02/2024    HDL 43.8 08/06/2024    HDL 43.3 04/27/2023     Lab Results   Component Value Date    LDLCALC 124 (H) 12/02/2024    LDLCALC 136 (H) 08/06/2024     Lab Results   Component Value Date    TRIG 148 12/02/2024    TRIG 112 08/06/2024    TRIG 90 04/27/2023     No components found for: \"CHOLHDL\"      Assessment/Plan   Problem List Items Addressed This Visit       Dyslipidemia    HBP (high blood pressure)    Class 3 severe obesity with serious comorbidity and body mass index (BMI) of 40.0 to 44.9 in adult     On ozempic         Controlled type 2 diabetes mellitus without complication, without long-term current use of insulin (Multi) - Primary     Hba1c 6.2  On metformin and ozempic         Relevant Orders    POCT fingerstick glucose manually resulted (Completed)         "

## 2024-12-02 NOTE — PATIENT INSTRUCTIONS
Was nice seeing you today.  Continue same medication, decrease metformin to 500 daily, increase  fiber to avoid constipation.  Have lab work done before next appointment if labs were ordered today.  Fu in 3 month.  Call/ contact our office with any concerns.    If you have labs or test done and you can't see the report in your chart or you didn't hear from us in 2 weeks after test/labs done , please, call our office for reports.  Please , do not assume that they were normal.    Any test results  and questions you might have , will be discussed at next visit -- please make sure to make a follow up appt after testing if reports are abnormal or you have questions.

## 2024-12-02 NOTE — ASSESSMENT & PLAN NOTE
Cardiac Risk Assessment  More then 15 minutes were spent assessing and discussing cardiovascular risk was and, if needed, lifestyle modifications recommended, including nutritional choices, exercise, and elimination of habits contributing to risk. Aspirin use/disuse was discussed following the guidelines below.     Low dose ASA ( mg) should be considered:  If you have prior Heart Attack/Stroke/Peripheral vascular disease:  Generally recommend daily low dose aspirin unless extremely high bleeding risk (e.g., gastrointestinal).     If you do not have prior Heart Attack/Stroke/Peripheral vascular disease:    Age < 70 and your 10-year cardiovascular disease risk is >20%, use low dose Aspirin   Age >=70: Do not use Aspirin for prevention  I have discussed the cardiovascular risk and behavioral modification, nutrition, exercise and elimination of habits contributing to risk. We agreed to a plan how to reduce the risk.  CV risk estimate calculates is 4.9%  \will start cresor 5 mg

## 2024-12-05 ENCOUNTER — APPOINTMENT (OUTPATIENT)
Dept: PHARMACY | Facility: HOSPITAL | Age: 54
End: 2024-12-05
Payer: COMMERCIAL

## 2024-12-05 DIAGNOSIS — E11.9 TYPE 2 DIABETES MELLITUS WITHOUT COMPLICATION, WITHOUT LONG-TERM CURRENT USE OF INSULIN (MULTI): Primary | ICD-10-CM

## 2024-12-05 DIAGNOSIS — E66.813 CLASS 3 SEVERE OBESITY DUE TO EXCESS CALORIES WITH SERIOUS COMORBIDITY AND BODY MASS INDEX (BMI) OF 40.0 TO 44.9 IN ADULT: ICD-10-CM

## 2024-12-05 DIAGNOSIS — E66.01 CLASS 3 SEVERE OBESITY DUE TO EXCESS CALORIES WITH SERIOUS COMORBIDITY AND BODY MASS INDEX (BMI) OF 40.0 TO 44.9 IN ADULT: ICD-10-CM

## 2024-12-05 DIAGNOSIS — E11.9 CONTROLLED TYPE 2 DIABETES MELLITUS WITHOUT COMPLICATION, WITHOUT LONG-TERM CURRENT USE OF INSULIN (MULTI): ICD-10-CM

## 2024-12-05 RX ORDER — ACETAMINOPHEN 500 MG
5 TABLET ORAL NIGHTLY PRN
COMMUNITY

## 2024-12-05 ASSESSMENT — ENCOUNTER SYMPTOMS
WEIGHT LOSS: 1
FATIGUE: 0
WEAKNESS: 0
VISUAL CHANGE: 0

## 2024-12-05 NOTE — Clinical Note
Plan:  1. Continue Semaglutide 0.25 mg for one more dose and then start ozempic 0.5 mg once weekly  2. Take metformin with lunch or dinner to decrease nausea  Clinical Pharmacist follow-up: in 5 weeks; Telehealth visit on 1/9/25 @4:30 PM  Upcoming Appointments:  · Ophthalmologist 11/28/25  · PCP: 3/7/25 · Nutrition 1/17/25 DISPLAY PLAN FREE TEXT

## 2024-12-05 NOTE — ASSESSMENT & PLAN NOTE
Assessment/Plan        Patient's Personal Goals of Therapy:  Be healthier  Spend more time with family  Extend quality and quantity of life  Decrease the amount of medications she needs to take  _____________________________________________________________________________    DIABETES MELLITUS TYPE 2 & OBESITY PLAN:  ____________________________________________________________________________    Patient Goals  A1c: less than <6)  %  BMI < 30  Decrease LDL-C by 30-50%  LDL-C <50  Improve diet and increase exercise    Diabetes   Diabetes mellitus Type II, under satisfactory control.  The patient has just been diagnosed with type 2 diabetes. Her A1c has worsened over the past few years, but improved to 6.3 on 12/2/24 since October (6.9).   Plan:   Continue Semaglutide 0.25 mg for one more dose and then start ozempic 0.5 mg once weekly   Take metformin with lunch or dinner to decrease nausea   Rationale:   Candidate for GLP? Yes, tolerating well on 0.25 mg and metformin was decreased at last PCP visit  Candidate for SGLT2? Potentially in the future, due to her history of UTIs, this may not be the ideal choice; but may be an effective add on therapy to a GLP and metformin  Candidate for DPP4? No, not if the patient starts a GLP-1  Candidate for Metformin? Yes; on 500 mg, patient denied any GI symptoms since last appt and has sufficient renal function (of note eGFR seems to be down trending and now is at 50. Metformin should be stopped if renal function continues to decline   Candidate for Insulin? No; A1c >/= 11? No  Medication Changes:   Completed Today  Discussed general issues about diabetes pathophysiology and management.  Counseling at today's visit: focused on the need for regular aerobic exercise and discussed the advantages of a diet low in carbohydrates.  Encouraged aerobic exercise.  Discussed foot care.  Continue semaglutide titration  Labs: fasting blood sugar, fasting lipid panel, hemoglobin A1C,  microalbuminuria, and CBC in 3 months. DM2 diagnosed in August 2024 ago, last A1c 12/2/24 - Next A1c due by end of Feb 2024 or beginning of March 2024    Obesity   Current regimen includes metformin and semaglutide. Patient's current weight reported as 240 lbs. Has lost 17 lbs or ~7% of TBW in the past 3 months. Semaglutide is not at therapeutic dose at this time, but will continue to be titrated as tolerated. Patient's weight loss is attributed to her diet, exercise, and decreased appetite since starting metformin.    Medication Changes: Continue semaglutide titration as in the above diabetes plan    Future Considerations:  Depending on tolerability - could switch from semaglutide to Tirzepatide  Discontinue metformin when on max tolerated semaglutide dose    Education:  Diet:   Patient is well educated about her diet and since the diagnosis of diabetes a few weeks ago she has been working to minimize carbs and sugar where she can. The patient states she's implementing more greens and trying to decrease her rice and pasta weekly consumption.   Exercise  Patient states she has a goal to walk her dog 3 times a week for about 20-30 minutes. Once she has obtained this goal and can keep up this physical activity she will increase her exercise further    Monitoring  All questions and concerns addressed. Contact pharmacist with any further questions or concerns prior to next appointment.   Reinforced healthy diet, lifestyle, and exercise.  Follow up in  5 week(s).    Clinical Pharmacist follow-up: in 5 weeks; Telehealth visit on 1/9/25 @4:30 PM   Upcoming Appointments:   Ophthalmologist 11/28/25   PCP: 3/7/25  Nutrition 1/17/25

## 2024-12-05 NOTE — PROGRESS NOTES
Clinical Pharmacy Appointment    Patient ID: [id number]  Adriana Rangel is a 54 y.o. female who presents for Follow Up appointment. Reason for Referral: DM and Obesity Medication Management    PCP and Referring Prescriber: Anshul Blanc MD   Last visit with PCP: 12/02/24  Started statin  Decreased metformin to 500 mg     Last Pharmacy Appt:  Her diabetes is new and it cannot be determined if it is stable or improving. Her A1c has worsened over the past few years  Medication changes  Start Semaglutide 0.25 mg   Continue metformin  Patient Goals: Improve diet and increase exercise  Candidate for SGLT2? Potentially in the future, due to her history of UTIs, this may not be the ideal choice; but may be an effective add on therapy to a GLP and metformin    Subjective     PMH:   No past medical history on file. Below is from chart review  Vanessa, multiple ADRs, hyperglycemia, Obesity, dyslipidemia, hx of hypokalemia, essential hypertension, migraine, Vitamin D deficiency, osteoarthritis, benign neoplasms breast and skin, chronic pain after a fall, intercostal neuralgia, hypothyroidism    Social Hx:  Never Smoked   Alcohol 0 drinks per week, rarely drinks  Drugs:  Denies illicit drug use, denies use of marijuana     Family Hx:  Mother: HTN, CVA    HPI:   Diabetes  She presents for her initial diabetic visit. She has type 2 diabetes mellitus. The initial diagnosis of diabetes was made 2 weeks ago. Her disease course has been stable. There are no hypoglycemic associated symptoms. Associated symptoms include polyuria and weight loss. Pertinent negatives for diabetes include no chest pain, no fatigue, no foot paresthesias, no foot ulcerations, no visual change and no weakness. There are no hypoglycemic complications. Symptoms are stable. There are no diabetic complications. Risk factors for coronary artery disease include dyslipidemia, family history, obesity, hypertension, diabetes mellitus and sedentary lifestyle.  Current diabetic treatment includes diet and oral agent (dual therapy). She is compliant with treatment all of the time. She is following a high fat/cholesterol and generally healthy diet. Meal planning includes avoidance of concentrated sweets. She has not had a previous visit with a dietitian. She participates in exercise intermittently. There is no change in her home blood glucose trend. An ACE inhibitor/angiotensin II receptor blocker is being taken. She does not see a podiatrist.Eye exam is current.     _____________________________________________________________________________    DIABETES MELLITUS type 2 & Obesity   _____________________________________________________________________________    Diagnosed with diabetes:  < 1 year (2024) Known diabetic complications: none  Does patient follow with Endocrinology: No  Last optometry exam: < 1 year, sees an eye doctor  Most recent visit in Podiatry: No, recent diagnosis-  to podiatry   **-- patient denies sores or cuts on feet today; has started to wear diabetic socks and uses compression socks     Current diabetic medications include:  Metformin  mg once daily  Ozempic 0.25 mg x 3 doses    Past diabetic medications include:  Metformin XR 1000 mg once daily    Glucose Readings:   Patient does not take her blood sugar at home nor has she ever done this in the past - if home BG readings become neccesary, the patient would require proper education  Patient does not have a glucometer at home    HYPOGLYCEMIA  Any episodes of hypoglycemia? N/A.  Does not take sugars at home  Did patient treat episode of hypoglycemia appropriately? N/A Patient is very low risk for hypoglycemia while on metformin monotherapy   Does the patient have a prescription for ready-to-use Glucagon? Not on insulin    Does pt have proteinuria? No    BMI: >/= 35? Yes, 42 (improved at last visit at 45)    Weight loss since first appointment: 17 lbs or ~7% TBW loss  - Patient does not weigh  herself at home  Weight  - 12/2/24: 245  - 10/31:/24 250 lbs   - 9/9/24: 262 lbs    Lifestyle:  Endorses decreased appetite since starting metformin  Improved diet  Diet: 3 meals/day  BK: 2 scrambled eggs with olive oil or butter, cream of wheat, oatmeal packets (rarely) Ep[employer] oats flavored oatmeal variety pack  LN: Salad (rarely with croutons does not use much dressing), or soup. Since the new DM diagnoses she states she is trying to stop eating sandwiches and bread in general  DN: Beef, potatoes are minimized, broccoli as a side, rice and or pasta ~3 days a week  Snacks: no longer snacking as she was told that she was not supposed snack anymore, but has on occasion ate celery and carrots, broccoli, or peanuts    Drinks: is only drinking water now, if she wants a flavored beverage she will have an iced tea with no sugar added (~3x a week)  Good compliance with carbohydrate counting, high fat food choices, and is trying to change her lifestyle habits. The patient exhibits a high level of understanding and knowledge regarding a diabetic diet and the benefits of increased exercise.   Physical Activity:   Since last appt the patient bought home work out equipment  Bike pedal home: she uses this on both her arms and legs x 3 times a week  Attempting to increase walking, walking dog (great domi) - 3x a week for 20-30 minutes,  usually walks the dog on other days  weather has impeded her outside walks, but tries to do as 3 times a week but lately has not     Secondary Prevention:  Statin? No   The 10-year ASCVD risk score (Yeimi CHRISTIANSEN, et al., 2019) is: 4.9%    Values used to calculate the score:      Age: 54 years      Sex: Female      Is Non- : No      Diabetic: Yes      Tobacco smoker: No      Systolic Blood Pressure: 110 mmHg      Is BP treated: Yes      HDL Cholesterol: 41 mg/dL      Total Cholesterol: 195 mg/dL  ACE-I/ARB? Yes  Aspirin? Uses it for headaches on occasion. States she used  to take acetaminophen but as her mom lives with her and her mom was instructed not to take it, the patient stopped buying it. Educated on acetaminophen being the preferred medication    Pertinent PMH Review:  PMH of Pancreatitis: No  PMH of Retinopathy: No  PMH of Urinary Tract Infections: Yes, prior but no longer ~1 year ago  PMH of MTC: No  HF: No  _____________________________________________________________________________    Allergies   Reviewed? Yes  Changes? No    Medication Reconciliation:  OTCs?   none    Drug Interactions  The following drug interactions were noted: aspirin and HTN    Medication System Management  Patient's preferred pharmacy: [facility]  Adherence/Medication Access:   Have you had any missed doses? No    _____________________________________________________________________________    Objective   Allergies   Allergen Reactions    Erythromycin Base Hives    Lisinopril Cough    Sulfa (Sulfonamide Antibiotics) Hives    Latex Rash     Social History     Social History Narrative    Not on file      Medication Review  Current Outpatient Medications   Medication Instructions    amLODIPine (NORVASC) 5 mg, oral, Daily    ascorbic acid (VITAMIN C) 500 mg, Daily    aspirin 325 mg, Daily PRN    calcium carbonate/vitamin D3 (CALCIUM 500 + D, D3, ORAL) Take by mouth.    chlorthalidone (Hygroton) 25 mg tablet TAKE 1 TABLET BY MOUTH EVERY DAY    cholecalciferol (VITAMIN D-3) 50 mcg, Daily    levothyroxine (SYNTHROID, LEVOXYL) 50 mcg, oral, Daily    losartan (Cozaar) 50 mg tablet TAKE 1 TABLET BY MOUTH EVERY DAY    melatonin 5 mg, Nightly PRN    metFORMIN XR (GLUCOPHAGE-XR) 500 mg, oral, Daily with evening meal, Take 2 tablets daily with breakfast. Do not crush, chew, or split.    mv-calcium-min-iron fm-FA-vitK (Multi For Her) 18 mg iron-600 mcg-80 mcg tablet 1 tablet, Daily    rosuvastatin (CRESTOR) 5 mg, oral, Daily    saccharomyces boulardii (FLORASTOR) 250 mg, Daily    [START ON 12/11/2024] semaglutide 0.5  "mg, subcutaneous, Weekly    spironolactone (ALDACTONE) 25 mg, oral, Daily      Vitals  BP Readings from Last 2 Encounters:   12/02/24 110/68   08/21/24 110/62     Labs  A1C  Lab Results   Component Value Date    HGBA1C 6.2 (H) 12/02/2024    HGBA1C 6.9 (H) 08/06/2024    HGBA1C 6.2 08/16/2023     BMP  Lab Results   Component Value Date    CALCIUM 9.9 12/02/2024     12/02/2024    K 3.5 12/02/2024    CO2 31 12/02/2024    CL 97 (L) 12/02/2024    BUN 21 12/02/2024    CREATININE 1.26 (H) 12/02/2024    EGFR 51 (L) 12/02/2024     LFTs  Lab Results   Component Value Date    ALT 38 12/02/2024    AST 23 12/02/2024    ALKPHOS 86 12/02/2024    BILITOT 0.7 12/02/2024     FLP  Lab Results   Component Value Date    TRIG 148 12/02/2024    CHOL 195 12/02/2024    LDLF 117 (H) 04/27/2023    LDLCALC 124 (H) 12/02/2024    HDL 41.0 12/02/2024     Urine Microalbumin  No results found for: \"MICROALBCREA\"  Weight Management  Wt Readings from Last 3 Encounters:   12/02/24 111 kg (245 lb 12.8 oz)   09/09/24 119 kg (262 lb 5.6 oz)   08/21/24 119 kg (262 lb)      There is no height or weight on file to calculate BMI.   _____________________________________________________________________________    Assessment/Plan        Patient's Personal Goals of Therapy:  Be healthier  Spend more time with family  Extend quality and quantity of life  Decrease the amount of required daily medications   _____________________________________________________________________________    DIABETES MELLITUS TYPE 2 & OBESITY PLAN:  ____________________________________________________________________________    Patient Goals  A1c: less than <6)  %  BMI < 30  Decrease LDL-C by 30-50%  LDL-C <50  Improve diet and increase exercise    Diabetes   Diabetes mellitus Type II, under satisfactory control.  The patient has just been diagnosed with type 2 diabetes. Her A1c has worsened over the past few years, but improved to 6.3 on 12/2/24 since October (6.9) .   Plan: "   Continue Semaglutide 0.25 mg for one more dose and then start ozempic 0.5 mg once weekly   Take metformin with lunch or dinner to decrease nausea   Rationale:   Candidate for GLP? Yes, tolerating well on 0.25 mg and metformin was decreased at last PCP visit due to nausea  Candidate for SGLT2? Potentially in the future, due to her history of UTIs, this may not be the ideal choice; but may be an effective add on therapy to a GLP and metformin  Candidate for DPP4? No, not if the patient starts a GLP-1  Candidate for Metformin? Yes; on 500 mg, patient denied any GI symptoms since last appt and has sufficient renal function (of note eGFR seems to be down trending and now is at 50. Metformin should be stopped if renal function continues to decline   Candidate for Insulin? No; A1c >/= 11? No  Medication Changes:   Completed Today  Discussed general issues about diabetes pathophysiology and management.  Counseling at today's visit: focused on the need for regular aerobic exercise and discussed the advantages of a diet low in carbohydrates.  Encouraged aerobic exercise.  Discussed foot care.  Continue semaglutide titration  Labs: fasting blood sugar, fasting lipid panel, hemoglobin A1C, microalbuminuria, and CBC in 3 months. DM2 diagnosed in August 2024 ago, last A1c 12/2/24 - Next A1c due by end of Feb 2024 or beginning of March 2024    Obesity   Current regimen includes metformin and semaglutide. Patient's current weight reported as 240 lbs. Has lost 17 lbs or ~7% of TBW in the past 3 months. Semaglutide is not at therapeutic dose at this time, but will continue to be titrated as tolerated. Patient's weight loss is attributed to her diet, exercise, and decreased appetite since starting metformin.    Medication Changes: Continue semaglutide titration as in the above diabetes plan    Future Considerations:  Depending on tolerability - could switch from semaglutide to Tirzepatide  Discontinue metformin when on max tolerated  semaglutide dose    Education:  Diet:   Patient is well educated about her diet and since the diagnosis of diabetes a few weeks ago she has been working to minimize carbs and sugar where she can. The patient states she's implementing more greens and trying to decrease her rice and pasta weekly consumption.   Exercise  Patient states she has a goal to walk her dog 3 times a week for about 20-30 minutes. Once she has obtained this goal and can keep up this physical activity she will increase her exercise further    Monitoring  All questions and concerns addressed. Contact pharmacist with any further questions or concerns prior to next appointment.   Reinforced healthy diet, lifestyle, and exercise.  Follow up in  5 week(s).    Clinical Pharmacist follow-up: in 5 weeks; Telehealth visit on 1/9/25 @4:30 PM   Upcoming Appointments:   Ophthalmologist 11/28/25   PCP: 3/7/25  Nutrition 1/17/25    Continue all meds under the continuation of care with the referring provider and clinical pharmacy team.    Thank you,    Eda Ramon PharmD   Clinical Pharmacist Specialist, Primary Care   Phone: [phone]   Fax: [phone]   Email: [email or web address]    Verbal consent to manage patient's drug therapy was obtained from the patient. They were informed they may decline to participate or withdraw from participation in pharmacy services at any time

## 2024-12-05 NOTE — PATIENT INSTRUCTIONS
NAME: Adriana Rangel   DATE: 12/5/2024     Your Pharmacist Today: Eda Ramon PharmD  Your Primary Care Physician: Anshul Blanc MD   Your Referring Provider: No ref. provider found    Thank you for your time today, Ms. Adriana Rangel. It was a pleasure managing your health together! Below is a summary of your treatment plan, other important information, and our pharmacy team's contact numbers:  If you need to schedule or re-schedule an appointment with us, please call our scheduling line at [phone], option 1.   To schedule non-pharmacy appointments please call [phone]  If you are out of medication refills or have a medical question, please contact me at my direct line, [phone]. Please allow for up to 48 hours for a return call. You can also contact me through [employer].    DIAGNOSIS:  T2DM and Weight Loss      TREATMENT PLAN     Medication Changes:   Continue Semaglutide 0.25 mg for one more dose and then start ozempic 0.5 mg once weekly   Take metformin with lunch or dinner to decrease nausea      Patient Goals  A1c: less than <6)  %  BMI < 30  Decrease LDL-C by 30-50%  LDL-C <50  Improve diet and increase exercise    Personal Goals of Therapy:  Be healthier  Spend more time with family  Extend quality and quantity of life  Decrease the amount of required daily medications     Patient Instructions/Plan  Exercise daily     Follow-up Appointment:   Follow-up with me in in 5 weeks; Telehealth visit on 1/9/25 @4:30 PM   Upcoming Appointments:   Ophthalmologist 11/28/25   PCP: 3/7/25  Nutrition 1/17/25.    Eda Ramon PharmD   Clinical Pharmacist Specialist, Primary Care   Phone: [phone]   Fax: [phone]   Email: [email or web address]      IMPORTANT INFORMATION     Please call 911 for medical emergencies.  Our pharmacy team is generally available from Monday-Friday, 8 am - 5 pm.  If you need to get in touch with me, you may either call me at my direct line or you can use  [employer].  If you are unable to make your appointment, kindly call your our pharmacy scheduling line at [phone] at least 48 hours in advance to cancel and reschedule.  There are no supporting services by the pharmacy team on weekends and holidays, or after 5 PM on weekdays.      RETAIL PHARMACY     [facility]    [address]  [place] [id number]  Phone: [phone]  Hours: M-F: 8 am - 6 pm;   Saturday: 8 am - 4 pm; Sunday 9 am - 1 pm

## 2024-12-22 DIAGNOSIS — E03.9 HYPOTHYROIDISM, ADULT: ICD-10-CM

## 2024-12-22 DIAGNOSIS — I10 ESSENTIAL (PRIMARY) HYPERTENSION: ICD-10-CM

## 2024-12-23 RX ORDER — CHLORTHALIDONE 25 MG/1
25 TABLET ORAL DAILY
Qty: 45 TABLET | Refills: 11 | Status: SHIPPED | OUTPATIENT
Start: 2024-12-23

## 2024-12-23 RX ORDER — LEVOTHYROXINE SODIUM 50 UG/1
50 TABLET ORAL DAILY
Qty: 30 TABLET | Refills: 2 | Status: SHIPPED | OUTPATIENT
Start: 2024-12-23

## 2024-12-25 ENCOUNTER — PATIENT MESSAGE (OUTPATIENT)
Dept: PRIMARY CARE | Facility: CLINIC | Age: 54
End: 2024-12-25
Payer: COMMERCIAL

## 2024-12-25 DIAGNOSIS — E11.9 CONTROLLED TYPE 2 DIABETES MELLITUS WITHOUT COMPLICATION, WITHOUT LONG-TERM CURRENT USE OF INSULIN (MULTI): Primary | ICD-10-CM

## 2024-12-26 PROCEDURE — RXMED WILLOW AMBULATORY MEDICATION CHARGE

## 2024-12-27 ENCOUNTER — PHARMACY VISIT (OUTPATIENT)
Dept: PHARMACY | Facility: CLINIC | Age: 54
End: 2024-12-27
Payer: COMMERCIAL

## 2025-01-09 ENCOUNTER — APPOINTMENT (OUTPATIENT)
Dept: PHARMACY | Facility: HOSPITAL | Age: 55
End: 2025-01-09
Payer: COMMERCIAL

## 2025-01-09 DIAGNOSIS — E66.813 CLASS 3 SEVERE OBESITY DUE TO EXCESS CALORIES WITH SERIOUS COMORBIDITY AND BODY MASS INDEX (BMI) OF 40.0 TO 44.9 IN ADULT: ICD-10-CM

## 2025-01-09 DIAGNOSIS — E11.9 TYPE 2 DIABETES MELLITUS WITHOUT COMPLICATION, WITHOUT LONG-TERM CURRENT USE OF INSULIN (MULTI): Primary | ICD-10-CM

## 2025-01-09 DIAGNOSIS — E66.01 CLASS 3 SEVERE OBESITY DUE TO EXCESS CALORIES WITH SERIOUS COMORBIDITY AND BODY MASS INDEX (BMI) OF 40.0 TO 44.9 IN ADULT: ICD-10-CM

## 2025-01-09 ASSESSMENT — ENCOUNTER SYMPTOMS
WEIGHT LOSS: 1
VISUAL CHANGE: 0
FATIGUE: 0
WEAKNESS: 0

## 2025-01-09 NOTE — PROGRESS NOTES
Clinical Pharmacy Appointment    Patient ID: [id number]  Adriana Rangel is a 54 y.o. female who presents for Follow Up appointment. Reason for Referral: DM and Obesity Medication Management    PCP and Referring Prescriber: Anshul Blanc MD   Last visit with PCP: 12/02/24  Started statin  Decreased metformin to 500 mg     Last Pharmacy Appt:  Her diabetes is new and it cannot be determined if it is stable or improving. Her A1c has worsened over the past few years  Medication changes  Increase Semaglutide 0.5 mg   Continue metformin  Patient Goals: Improve diet and increase exercise    Subjective     PMH:   No past medical history on file. Below is from chart review  Hammertoe, multiple ADRs, hyperglycemia, Obesity, dyslipidemia, hx of hypokalemia, essential hypertension, migraine, Vitamin D deficiency, osteoarthritis, benign neoplasms breast and skin, chronic pain after a fall, intercostal neuralgia, hypothyroidism    Social Hx:  Never Smoked   Alcohol 0 drinks per week, rarely drinks  Drugs:  Denies illicit drug use, denies use of marijuana     Family Hx:  Mother: HTN, CVA    HPI:   Diabetes  She presents for her follow-up diabetic visit. She has type 2 diabetes mellitus. The initial diagnosis of diabetes was made 2 weeks ago. Her disease course has been stable. There are no hypoglycemic associated symptoms. Associated symptoms include weight loss. Pertinent negatives for diabetes include no chest pain, no fatigue, no foot paresthesias, no foot ulcerations, no polyuria, no visual change and no weakness. There are no hypoglycemic complications. Symptoms are stable. There are no diabetic complications. Risk factors for coronary artery disease include dyslipidemia, family history, obesity, hypertension, diabetes mellitus and sedentary lifestyle. Current diabetic treatment includes diet and oral agent (dual therapy). She is compliant with treatment all of the time. She is following a high fat/cholesterol and  generally healthy diet. Meal planning includes avoidance of concentrated sweets. She has not had a previous visit with a dietitian. She participates in exercise intermittently. There is no change in her home blood glucose trend. An ACE inhibitor/angiotensin II receptor blocker is being taken. She does not see a podiatrist.Eye exam is current.     _____________________________________________________________________________    DIABETES MELLITUS type 2 & Obesity   _____________________________________________________________________________    Diagnosed with diabetes:  < 1 year (2024) Known diabetic complications: none  Does patient follow with Endocrinology: No  Last optometry exam: < 1 year, sees an eye doctor  Most recent visit in Podiatry: No, recent diagnosis-  to podiatry   **-- patient denies sores or cuts on feet today; has started to wear diabetic socks and uses compression socks     Current diabetic medications include:  Metformin  mg once daily  Ozempic 0.5 mg x 4 doses on Wednesdays   No adrs    Past diabetic medications include:  Metformin XR 1000 mg once daily  Ozempic 0.25 mg     Glucose Readings:   Patient does not take her blood sugar at home nor has she ever done this in the past - if home BG readings become neccesary, the patient would require proper education  Patient does not have a glucometer at home    HYPOGLYCEMIA  Any episodes of hypoglycemia? N/A.  Does not take sugars at home  Did patient treat episode of hypoglycemia appropriately? N/A Patient is very low risk for hypoglycemia while on metformin monotherapy   Does the patient have a prescription for ready-to-use Glucagon? Not on insulin    Does pt have proteinuria? No    BMI: >/= 35? Yes, 42 (improved at last visit at 45)    Weight loss since first appointment: 17 lbs or ~10% TBW loss  - Patient does not weigh herself at home  Weight  - 1/9/24: 235 lbs  - 12/2/24: 245  - 10/31:/24 250 lbs   - 9/9/24: 262 lbs    Lifestyle:  Endorses  decreased appetite since starting metformin  Improved diet  Diet: 3 meals/day  BK: 2 scrambled eggs with olive oil or butter, cream of wheat, oatmeal packets (rarely) Episcopal oats flavored oatmeal variety pack  LN: Salad (rarely with croutons does not use much dressing), or soup. Since the new DM diagnoses she states she is trying to stop eating sandwiches and bread in general  DN: Beef, potatoes are minimized, broccoli as a side, rice and or pasta ~3 days a week  Snacks: no longer snacking as she was told that she was not supposed snack anymore, but has on occasion ate celery and carrots, broccoli, or peanuts    Drinks: is only drinking water now, if she wants a flavored beverage she will have an iced tea with no sugar added (~3x a week)  Good compliance with carbohydrate counting, high fat food choices, and is trying to change her lifestyle habits. The patient exhibits a high level of understanding and knowledge regarding a diabetic diet and the benefits of increased exercise.   Physical Activity:   Since last appt the patient bought home work out equipment  Bike pedal home: she uses this on both her arms and legs x 3 times a week  Attempting to increase walking, walking dog (great domi) - 3x a week for 20-30 minutes,  usually walks the dog on other days  weather has impeded her outside walks, but tries to do as 3 times a week but lately has not     Secondary Prevention:  Statin? No   The 10-year ASCVD risk score (Yeimi CHRISTIANSEN, et al., 2019) is: 4.9%    Values used to calculate the score:      Age: 54 years      Sex: Female      Is Non- : No      Diabetic: Yes      Tobacco smoker: No      Systolic Blood Pressure: 110 mmHg      Is BP treated: Yes      HDL Cholesterol: 41 mg/dL      Total Cholesterol: 195 mg/dL  ACE-I/ARB? Yes  Aspirin? Uses it for headaches on occasion. States she used to take acetaminophen but as her mom lives with her and her mom was instructed not to take it, the  patient stopped buying it. Educated on acetaminophen being the preferred medication    Pertinent PMH Review:  PMH of Pancreatitis: No  PMH of Retinopathy: No  PMH of Urinary Tract Infections: Yes, prior but no longer ~1 year ago  PMH of MTC: No  HF: No  _____________________________________________________________________________    Allergies   Reviewed? Yes  Changes? No    Medication Reconciliation:  OTCs?   none    Drug Interactions  The following drug interactions were noted: aspirin and HTN    Medication System Management  Patient's preferred pharmacy: [facility]  Adherence/Medication Access:   Have you had any missed doses? No    _____________________________________________________________________________    Objective   Allergies   Allergen Reactions    Erythromycin Base Hives    Lisinopril Cough    Sulfa (Sulfonamide Antibiotics) Hives    Latex Rash     Social History     Social History Narrative    Not on file      Medication Review  Current Outpatient Medications   Medication Instructions    amLODIPine (NORVASC) 5 mg, oral, Daily    ascorbic acid (VITAMIN C) 500 mg, Daily    aspirin 325 mg, Daily PRN    calcium carbonate/vitamin D3 (CALCIUM 500 + D, D3, ORAL) Take by mouth.    chlorthalidone (HYGROTON) 25 mg, oral, Daily, Takes an additional tablet if she has edema and if the swelling is not relieved by this second tablet she was directed to call her PCP    cholecalciferol (VITAMIN D-3) 50 mcg, Daily    levothyroxine (SYNTHROID, LEVOXYL) 50 mcg, oral, Daily    losartan (Cozaar) 50 mg tablet TAKE 1 TABLET BY MOUTH EVERY DAY    melatonin 5 mg, Nightly PRN    metFORMIN XR (GLUCOPHAGE-XR) 500 mg, oral, Daily with evening meal, Take 2 tablets daily with breakfast. Do not crush, chew, or split.    mv-calcium-min-iron fm-FA-vitK (Multi For Her) 18 mg iron-600 mcg-80 mcg tablet 1 tablet, Daily    Ozempic 0.5 mg, subcutaneous, Weekly    rosuvastatin (CRESTOR) 5 mg, oral, Daily    saccharomyces boulardii (FLORASTOR) 250 mg,  "Daily    semaglutide 0.5 mg, subcutaneous, Weekly    spironolactone (ALDACTONE) 25 mg, oral, Daily      Vitals  BP Readings from Last 2 Encounters:   12/02/24 110/68   08/21/24 110/62     Labs  A1C  Lab Results   Component Value Date    HGBA1C 6.2 (H) 12/02/2024    HGBA1C 6.9 (H) 08/06/2024    HGBA1C 6.2 08/16/2023     BMP  Lab Results   Component Value Date    CALCIUM 9.9 12/02/2024     12/02/2024    K 3.5 12/02/2024    CO2 31 12/02/2024    CL 97 (L) 12/02/2024    BUN 21 12/02/2024    CREATININE 1.26 (H) 12/02/2024    EGFR 51 (L) 12/02/2024     LFTs  Lab Results   Component Value Date    ALT 38 12/02/2024    AST 23 12/02/2024    ALKPHOS 86 12/02/2024    BILITOT 0.7 12/02/2024     FLP  Lab Results   Component Value Date    TRIG 148 12/02/2024    CHOL 195 12/02/2024    LDLF 117 (H) 04/27/2023    LDLCALC 124 (H) 12/02/2024    HDL 41.0 12/02/2024     Urine Microalbumin  No results found for: \"MICROALBCREA\"  Weight Management  Wt Readings from Last 3 Encounters:   12/02/24 111 kg (245 lb 12.8 oz)   09/09/24 119 kg (262 lb 5.6 oz)   08/21/24 119 kg (262 lb)      There is no height or weight on file to calculate BMI.   _____________________________________________________________________________    Assessment/Plan        Patient's Personal Goals of Therapy:  Be healthier  Spend more time with family  Extend quality and quantity of life  Decrease the amount of required daily medications   _____________________________________________________________________________    DIABETES MELLITUS TYPE 2 & OBESITY PLAN:  ____________________________________________________________________________    Patient Goals  A1c: less than <6)  %  BMI < 30  Decrease LDL-C by 30-50%  LDL-C <50  Improve diet and increase exercise    Diabetes   Diabetes mellitus Type II, under satisfactory control.  The patient has just been diagnosed with type 2 diabetes. Her A1c has worsened over the past few years, but improved to 6.3 on 12/2/24 since " October (6.9) .   Plan:   INCREASE: Semaglutide 1 mg once weekly   Denies adrs  Further diabetes treatment, CVD/renal protection, and weight loss  DISCONTINUE metformin     Rationale:   Candidate for GLP? Yes  Candidate for SGLT2? Potentially in the future, due to her history of UTIs, this may not be the ideal choice; but may be an effective add on therapy to a GLP and metformin  Candidate for DPP4? No, not if the patient starts a GLP-1  Candidate for Metformin? ADRs with metformin and renal dysfunction - discontinuing now on semaglutide 1 mg   Candidate for Insulin? No; A1c >/= 11? No  Medication Changes:   Completed Today  Discussed general issues about diabetes pathophysiology and management.  Counseling at today's visit: focused on the need for regular aerobic exercise and discussed the advantages of a diet low in carbohydrates.  Encouraged aerobic exercise.  Discussed foot care.  Continue semaglutide titration  Labs: fasting blood sugar, fasting lipid panel, hemoglobin A1C, microalbuminuria, and CBC in 3 months. DM2 diagnosed in August 2024 ago, last A1c 12/2/24 - Next A1c due by end of Feb 2024 or beginning of March 2024    Obesity   Current regimen includes metformin and semaglutide. Patient's current weight reported as 235 lbs. Has lost 22 lbs or ~10% of TBW in the past 4 months. Semaglutide is now at a therapeutic dose, but will continue to be titrated as tolerated.    Medication Changes: Continue semaglutide titration as in the above diabetes plan    Future Considerations:  Depending on tolerability - could switch from semaglutide to Tirzepatide  Ozempic titration    Education:  Diet:   Patient is well educated about her diet and since the diagnosis of diabetes a few weeks ago she has been working to minimize carbs and sugar where she can. The patient states she's implementing more greens and trying to decrease her rice and pasta weekly consumption.   Exercise  Patient states she has a goal to walk her dog  3 times a week for about 20-30 minutes. Once she has obtained this goal and can keep up this physical activity she will increase her exercise further    Monitoring  All questions and concerns addressed. Contact pharmacist with any further questions or concerns prior to next appointment.   Reinforced healthy diet, lifestyle, and exercise.    Clinical Pharmacist follow-up: in 3 weeks; Telehealth visit  Upcoming Appointments:   PCP: 3/7/25  Nutrition 1/17/25    Continue all meds under the continuation of care with the referring provider and clinical pharmacy team.  Time Spent  Prep time on day of patient encounter: 10 minutes  Time spent directly with patient, family or caregiver: 11 minutes  Documentation Time: 15 minutes    Thank you,  Eda Ramon PharmD   Clinical Pharmacist Specialist, Primary Care   Phone: [phone]   Fax: [phone]   Email: [email or web address]    Verbal consent to manage patient's drug therapy was obtained from the patient. They were informed they may decline to participate or withdraw from participation in pharmacy services at any time

## 2025-01-15 ENCOUNTER — TELEPHONE (OUTPATIENT)
Dept: PRIMARY CARE | Facility: CLINIC | Age: 55
End: 2025-01-15
Payer: COMMERCIAL

## 2025-01-15 NOTE — PROGRESS NOTES
Reason for Visit:  Adriana Rangel is a 54 y.o. female who presents for Follow-up DSME Visit    DSME - Global Assessment    What are you most interested to learn about today? Not sure    Patient Active Problem List    Diagnosis Date Noted    Controlled type 2 diabetes mellitus without complication, without long-term current use of insulin (Multi) 08/21/2024    Epigastric pain 02/22/2024    Other constipation 02/22/2024    Diverticulitis 10/12/2023    Annual physical exam 08/16/2023    Angioma 02/03/2023    Bedbug bite 02/03/2023    Breast erythema 02/03/2023    Dermatofibroma 02/03/2023    Dyslipidemia 02/03/2023    Fatigue 02/03/2023    Hammertoe 02/03/2023    HBP (high blood pressure) 02/03/2023    High blood sugar 02/03/2023    Hypokalemia 02/03/2023    Hypothyroidism, adult 02/03/2023    Back pain 02/03/2023    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    Chronic pain of left knee 02/03/2023    Medication side effect 02/03/2023    Migraines 02/03/2023    Neck pain 02/03/2023    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    Class 3 severe obesity with serious comorbidity and body mass index (BMI) of 40.0 to 44.9 in adult 02/03/2023    Sternocostal pain 02/03/2023    Tendinitis 02/03/2023    Vitamin D deficiency 02/03/2023    Melanocytic nevi of trunk 01/27/2021    Other seborrheic keratosis 01/27/2021      Lab Results   Component Value Date    HGBA1C 6.2 (H) 12/02/2024    HGBA1C 6.9 (H) 08/06/2024    HGBA1C 6.2 08/16/2023    HGBA1C 5.9 (A) 04/27/2023    HGBA1C 5.5 08/17/2022         Diabetes Medications: non-insulin injectables  Current Outpatient Medications   Medication Sig Dispense Refill    amLODIPine (Norvasc) 5 mg tablet TAKE 1 TABLET BY MOUTH EVERY DAY 90 tablet 1    ascorbic acid (Vitamin C) 500 mg ER capsule Take 1 capsule (500 mg) by mouth once daily.      aspirin 325 mg tablet Take 1 tablet (325 mg) by mouth once daily as needed for mild pain (1 - 3) (Headache).      calcium carbonate/vitamin D3  (CALCIUM 500 + D, D3, ORAL) Take by mouth.      chlorthalidone (Hygroton) 25 mg tablet Take 1 tablet (25 mg) by mouth once daily. Takes an additional tablet if she has edema and if the swelling is not relieved by this second tablet she was directed to call her PCP 45 tablet 11    cholecalciferol (Vitamin D-3) 50 mcg (2,000 unit) capsule Take 1 capsule (50 mcg) by mouth once daily.      levothyroxine (Synthroid, Levoxyl) 50 mcg tablet TAKE 1 TABLET BY MOUTH EVERY DAY 30 tablet 2    losartan (Cozaar) 50 mg tablet TAKE 1 TABLET BY MOUTH EVERY DAY 30 tablet 2    melatonin 5 mg tablet Take 1 tablet (5 mg) by mouth as needed at bedtime for sleep.      mv-calcium-min-iron fm-FA-vitK (Multi For Her) 18 mg iron-600 mcg-80 mcg tablet Take 1 tablet by mouth 1 (one) time each day.      rosuvastatin (Crestor) 5 mg tablet Take 1 tablet (5 mg) by mouth once daily. 100 tablet 3    saccharomyces boulardii (Florastor) 250 mg capsule Take 1 capsule (250 mg) by mouth once daily.      semaglutide (OZEMPIC) 1 mg/dose (4 mg/3 mL) pen injector Inject 1 mg under the skin 1 (one) time per week. 3 mL 11    spironolactone (Aldactone) 25 mg tablet TAKE 1 TABLET BY MOUTH EVERY DAY 30 tablet 3     No current facility-administered medications for this visit.     DM medications as patient is taking them:    Now off Metformin due nausea.    Ozempic has increased to 1 mg per week. Started this yesterday.     Injection/Infusion Sites: abdominal wall.  helps me and injects into my butt check.      DSME - Goals and Recommendations    [facility] Diabetes Education Program SMART Behavior Goal Setting:        S - Specific: Exactly what do you want to do        M - Measurable: Use a calendar or chart to track progress        A - Attainable: Take small steps to make bigger changes        R - Realistic: Pick something reasonable that you know you can do        T - Time Oriented: Choose a time limit (No longer than 6 months)    Specific goal  from prior visit:  1. I will eat 1-2 fistfuls non-starchy vegetables at lunch daily.   Meeting goal more than half of the time.     2. She is now off Metformin so this goal is cancelled.     New goal  I will utilize my 10 minute morning breaks to get in exercise such as pedal machine, chair exercises, squats.   I will do this at least three times a week.     I will keep track of my progress daily by  other .    Time Oriented: two weeks.    Topics Covered and Impression:    DSME Topics Covered During Visit:   A1c Review  Being Physically Active  Healthy Meal Plan  MyPlate Method  Sick Day Rules  Site selection/rotation  Reviewed Diabetes Technology: none       Materials provided during today's visit: none    Provider Impression: Three month follow-up. Has lost 25 pounds thus far. A1c decreased to 6.2% down from 6.9%. Feels like she is eating more non-starchy veggies at dinner half the time as well. Reports nausea after having syrup in her cream of wheat last night. Eating lighter foods lately due to getting over illness. Attributes this to Ozempic as she's now off Metformin. This was first time she had 1 mg dosage. She continues to work with pharmacist regarding medications.   She is doing a little more physical activity than she was by walking dog twice a week and sometimes shoveling snow.   Reviewed physical activity recommendations for people with DM. Reminded her of handout given at first visit with free you tube exercise videos.   Discussed importance of muscle strengthening when taking Ozempic which causes weight loss, but some muscle mass is lost as well. Barrier to exercising is that she's tired at end of work day 5-6 pm. She does work from home several days per week. Discussed looking for ways she can grab 10-15 minutes during work day to do small intervals of activity. Admits she's a morning person and that time would be better.   Identified 10 minute break in the mornings. When working at home she can use  pedal machine, do chair exercises, resistance bands or squats. Said she can walk the atrium at work for 10 min on Tuesdays and Thursdays.   She was recently sick and was interested to discuss sick day guidelines.  The referring provider is within the same EMR and is able to see the DSMES provided and the outcomes.       Time: I personally spent a total of 60 minutes with the patient providing diabetes self-management education. Visit documentation will be sent electronically to referring provider.

## 2025-01-15 NOTE — TELEPHONE ENCOUNTER
We  received  info that her  ozempic  new  dosage needs PA  done , can you please  do the PA  for this  patient  and  let her know  ?   Thank you   and  have  a nice day!    Dr Guanaco garcia/ Deanna

## 2025-01-16 DIAGNOSIS — E66.813 CLASS 3 SEVERE OBESITY DUE TO EXCESS CALORIES WITH SERIOUS COMORBIDITY AND BODY MASS INDEX (BMI) OF 40.0 TO 44.9 IN ADULT: ICD-10-CM

## 2025-01-16 DIAGNOSIS — E66.01 CLASS 3 SEVERE OBESITY DUE TO EXCESS CALORIES WITH SERIOUS COMORBIDITY AND BODY MASS INDEX (BMI) OF 40.0 TO 44.9 IN ADULT: ICD-10-CM

## 2025-01-16 DIAGNOSIS — E11.9 TYPE 2 DIABETES MELLITUS WITHOUT COMPLICATION, WITHOUT LONG-TERM CURRENT USE OF INSULIN (MULTI): Primary | ICD-10-CM

## 2025-01-17 ENCOUNTER — TELEMEDICINE CLINICAL SUPPORT (OUTPATIENT)
Dept: NUTRITION | Facility: CLINIC | Age: 55
End: 2025-01-17
Payer: COMMERCIAL

## 2025-01-17 DIAGNOSIS — E11.9 CONTROLLED TYPE 2 DIABETES MELLITUS WITHOUT COMPLICATION, WITHOUT LONG-TERM CURRENT USE OF INSULIN (MULTI): ICD-10-CM

## 2025-01-17 PROCEDURE — G0108 DIAB MANAGE TRN  PER INDIV: HCPCS | Mod: 95 | Performed by: EMERGENCY MEDICINE

## 2025-01-30 ENCOUNTER — TELEPHONE (OUTPATIENT)
Dept: PHARMACY | Facility: HOSPITAL | Age: 55
End: 2025-01-30

## 2025-01-30 ENCOUNTER — APPOINTMENT (OUTPATIENT)
Dept: PHARMACY | Facility: HOSPITAL | Age: 55
End: 2025-01-30
Payer: COMMERCIAL

## 2025-01-30 PROCEDURE — RXMED WILLOW AMBULATORY MEDICATION CHARGE

## 2025-01-31 ENCOUNTER — PHARMACY VISIT (OUTPATIENT)
Dept: PHARMACY | Facility: CLINIC | Age: 55
End: 2025-01-31
Payer: COMMERCIAL

## 2025-01-31 ENCOUNTER — TELEPHONE (OUTPATIENT)
Dept: NUTRITION | Facility: CLINIC | Age: 55
End: 2025-01-31
Payer: COMMERCIAL

## 2025-01-31 NOTE — TELEPHONE ENCOUNTER
"Called two weeks to follow up on new goal she set. Goal she set \"at least 3 times per week I will utilize 10 min morning work break to get in exercise such as pedal machine, chair exercises, squats.  She is meeting this goal.   Additionally tries to go up and down the stairs a few times when home from work. This is new. She did fall on her knee when shoveling snow so is taking it slow going up and down stairs. Encouraged her to gauge her pain level and do what she can. Reminded her of long term exercise goals and how to gradually increase what she is doing. The referring provider is within the same EMR and is able to see the DSMES provided and the outcomes.   "

## 2025-02-06 ENCOUNTER — APPOINTMENT (OUTPATIENT)
Dept: PHARMACY | Facility: HOSPITAL | Age: 55
End: 2025-02-06
Payer: COMMERCIAL

## 2025-02-06 DIAGNOSIS — E11.9 TYPE 2 DIABETES MELLITUS WITHOUT COMPLICATION, WITHOUT LONG-TERM CURRENT USE OF INSULIN (MULTI): Primary | ICD-10-CM

## 2025-02-06 ASSESSMENT — ENCOUNTER SYMPTOMS
FATIGUE: 0
WEAKNESS: 0
WEIGHT LOSS: 1
VISUAL CHANGE: 0

## 2025-02-06 NOTE — PROGRESS NOTES
Clinical Pharmacy Appointment    Patient ID: [id number]  Adriana Rangel is a 54 y.o. female who presents for Follow Up appointment. Reason for Referral: DM and Obesity Medication Management    PCP and Referring Prescriber: Anshul Blanc MD   Last visit with PCP: 12/02/24    Subjective     PMH:   No past medical history on file. Below is from chart review  Hammertoe, multiple ADRs, hyperglycemia, Obesity, dyslipidemia, hx of hypokalemia, essential hypertension, migraine, Vitamin D deficiency, osteoarthritis, benign neoplasms breast and skin, chronic pain after a fall, intercostal neuralgia, hypothyroidism    Social Hx:  Never Smoked   Alcohol 0 drinks per week, rarely drinks  Drugs:  Denies illicit drug use, denies use of marijuana     Family Hx:  Mother: HTN, CVA    HPI:   Diabetes  She presents for her follow-up diabetic visit. She has type 2 diabetes mellitus. The initial diagnosis of diabetes was made 2 weeks ago. Her disease course has been stable. There are no hypoglycemic associated symptoms. Associated symptoms include weight loss. Pertinent negatives for diabetes include no chest pain, no fatigue, no foot paresthesias, no foot ulcerations, no polyuria, no visual change and no weakness. There are no hypoglycemic complications. Symptoms are stable. There are no diabetic complications. Risk factors for coronary artery disease include dyslipidemia, family history, obesity, hypertension, diabetes mellitus and sedentary lifestyle. Current diabetic treatment includes diet and oral agent (dual therapy). She is compliant with treatment all of the time. She is following a high fat/cholesterol and generally healthy diet. Meal planning includes avoidance of concentrated sweets. She has not had a previous visit with a dietitian. She participates in exercise intermittently. There is no change in her home blood glucose trend. An ACE inhibitor/angiotensin II receptor blocker is being taken. She does not see a  podiatrist.Eye exam is current.     DIABETES MELLITUS type 2 & Weight Loss    Diagnosed with diabetes:  < 1 year (2024) Known diabetic complications: none  Does patient follow with Endocrinology: No  Last optometry exam: < 1 year, sees an eye doctor  Most recent visit in Podiatry: No, recent diagnosis-  to podiatry   patient denies sores or cuts on feet today; has started to wear diabetic socks and uses compression socks     Current diabetic medications include:  Ozempic 1 mg on Tuesdays - first dose this week  No adrs    Past diabetic medications include:  Metformin    Glucose Readings:   Patient does not take her blood sugar at home nor has she ever done this in the past - if home BG readings become neccesary, the patient would require proper education  Patient does not have a glucometer at home    HYPOGLYCEMIA  Any episodes of hypoglycemia? N/A.  Does not take sugars at home  Did patient treat episode of hypoglycemia appropriately? N/A Patient is very low risk for hypoglycemia while on metformin monotherapy   Does the patient have a prescription for ready-to-use Glucagon? Not on insulin    Does pt have proteinuria? No    BMI: >/= 35? Yes, 42 (improved at last visit at 45)    Weight loss since first appointment: 17 lbs or ~10% TBW loss  - Patient does not weigh herself at home  Weight  - 1/9/24: 235 lbs  - 12/2/24: 245  - 10/31:/24 250 lbs   - 9/9/24: 262 lbs    Lifestyle:  Endorses decreased appetite since starting metformin  Improved diet  Diet: 3 meals/day  BK: 2 scrambled eggs with olive oil or butter, cream of wheat, oatmeal packets (rarely) Yazidi oats flavored oatmeal variety pack  LN: Salad (rarely with croutons does not use much dressing), or soup. Since the new DM diagnoses she states she is trying to stop eating sandwiches and bread in general  DN: Beef, potatoes are minimized, broccoli as a side, rice and or pasta ~3 days a week  Snacks: no longer snacking as she was told that she was not supposed  snack anymore, but has on occasion ate celery and carrots, broccoli, or peanuts    Drinks: is only drinking water now, if she wants a flavored beverage she will have an iced tea with no sugar added (~3x a week)  Good compliance with carbohydrate counting, high fat food choices, and is trying to change her lifestyle habits. The patient exhibits a high level of understanding and knowledge regarding a diabetic diet and the benefits of increased exercise.   Physical Activity:   Since last appt the patient bought home work out equipment  Bike pedal home: she uses this on both her arms and legs x 3 times a week  Attempting to increase walking, walking dog (great domi) - 3x a week for 20-30 minutes,  usually walks the dog on other days  weather has impeded her outside walks, but tries to do as 3 times a week but lately has not     Secondary Prevention:  Statin? No   The 10-year ASCVD risk score (Yeimi CHRISTIANSEN, et al., 2019) is: 4.9%    Values used to calculate the score:      Age: 54 years      Sex: Female      Is Non- : No      Diabetic: Yes      Tobacco smoker: No      Systolic Blood Pressure: 110 mmHg      Is BP treated: Yes      HDL Cholesterol: 41 mg/dL      Total Cholesterol: 195 mg/dL  ACE-I/ARB? Yes  Aspirin? Uses it for headaches on occasion. States she used to take acetaminophen but as her mom lives with her and her mom was instructed not to take it, the patient stopped buying it. Educated on acetaminophen being the preferred medication    Pertinent PMH Review:  PMH of Pancreatitis: No  PMH of Retinopathy: No  PMH of Urinary Tract Infections: Yes, prior but no longer ~1 year ago  PMH of MTC: No  HF: No  _____________________________________________________________________________    Allergies   Reviewed? Yes  Changes? No    Medication Reconciliation:  OTCs?   none    Drug Interactions  The following drug interactions were noted: aspirin and HTN    Medication System Management  Patient's  preferred [facility]  Adherence/Medication Access:   Have you had any missed doses? No    _____________________________________________________________________________    Objective   Allergies   Allergen Reactions    Erythromycin Base Hives    Lisinopril Cough    Sulfa (Sulfonamide Antibiotics) Hives    Latex Rash     Social History     Social History Narrative    Not on file      Medication Review  Current Outpatient Medications   Medication Instructions    amLODIPine (NORVASC) 5 mg, oral, Daily    ascorbic acid (VITAMIN C) 500 mg, Daily    aspirin 325 mg, Daily PRN    calcium carbonate/vitamin D3 (CALCIUM 500 + D, D3, ORAL) Take by mouth.    chlorthalidone (HYGROTON) 25 mg, oral, Daily, Takes an additional tablet if she has edema and if the swelling is not relieved by this second tablet she was directed to call her PCP    cholecalciferol (VITAMIN D-3) 50 mcg, Daily    levothyroxine (SYNTHROID, LEVOXYL) 50 mcg, oral, Daily    losartan (Cozaar) 50 mg tablet TAKE 1 TABLET BY MOUTH EVERY DAY    melatonin 5 mg, Nightly PRN    mv-calcium-min-iron fm-FA-vitK (Multi For Her) 18 mg iron-600 mcg-80 mcg tablet 1 tablet, Daily    Ozempic 1 mg, subcutaneous, Weekly    rosuvastatin (CRESTOR) 5 mg, oral, Daily    saccharomyces boulardii (FLORASTOR) 250 mg, Daily    semaglutide (OZEMPIC) 1 mg, subcutaneous, Weekly    spironolactone (ALDACTONE) 25 mg, oral, Daily      Vitals  BP Readings from Last 2 Encounters:   12/02/24 110/68   08/21/24 110/62     Labs  A1C  Lab Results   Component Value Date    HGBA1C 6.2 (H) 12/02/2024    HGBA1C 6.9 (H) 08/06/2024    HGBA1C 6.2 08/16/2023     BMP  Lab Results   Component Value Date    CALCIUM 9.9 12/02/2024     12/02/2024    K 3.5 12/02/2024    CO2 31 12/02/2024    CL 97 (L) 12/02/2024    BUN 21 12/02/2024    CREATININE 1.26 (H) 12/02/2024    EGFR 51 (L) 12/02/2024     LFTs  Lab Results   Component Value Date    ALT 38 12/02/2024    AST 23 12/02/2024    ALKPHOS 86 12/02/2024     "BILITOT 0.7 12/02/2024     FLP  Lab Results   Component Value Date    TRIG 148 12/02/2024    CHOL 195 12/02/2024    LDLF 117 (H) 04/27/2023    LDLCALC 124 (H) 12/02/2024    HDL 41.0 12/02/2024     Urine Microalbumin  No results found for: \"MICROALBCREA\"  Weight Management  Wt Readings from Last 3 Encounters:   12/02/24 111 kg (245 lb 12.8 oz)   09/09/24 119 kg (262 lb 5.6 oz)   08/21/24 119 kg (262 lb)      There is no height or weight on file to calculate BMI.   _____________________________________________________________________________    Assessment/Plan        Patient's Personal Goals of Therapy:  Be healthier  Spend more time with family  Extend quality and quantity of life  Decrease the amount of required daily medications   _____________________________________________________________________________    DIABETES MELLITUS TYPE 2 & OBESITY PLAN:  ____________________________________________________________________________    Patient Goals  A1c: less than <6)  %  BMI < 30  Decrease LDL-C by 30-50%  LDL-C <50  Improve diet and increase exercise    Diabetes   Diabetes mellitus Type II, under satisfactory control.  The patient has just been diagnosed with type 2 diabetes. Her A1c has worsened over the past few years, but improved to 6.3 on 12/2/24 since October (6.9) . Has had x1 dose of Ozempic 1 mg as she had issues with insurance and missed taking her medication for 1.5 weeks. Patient denies adrs and said she feels good and endorses increased satiety   Plan:   CONTINUE: Semaglutide 1 mg once weekly   Denies adrs  Further diabetes treatment, CVD/renal protection, and weight loss    Rationale:   Candidate for GLP? Yes  Candidate for SGLT2? Potentially in the future, due to her history of UTIs, this may not be the ideal choice; but may be an effective add on therapy to a GLP and metformin  Candidate for DPP4? No, not if the patient starts a GLP-1  Candidate for Metformin? ADRs with metformin and renal dysfunction " - discontinuing now on semaglutide 1 mg   Candidate for Insulin? No; A1c >/= 11? No  Medication Changes:   Completed Today  Discussed general issues about diabetes pathophysiology and management.  Counseling at today's visit: focused on the need for regular aerobic exercise and discussed the advantages of a diet low in carbohydrates.  Encouraged aerobic exercise.  Discussed foot care.  Continue semaglutide titration  Labs: fasting blood sugar, fasting lipid panel, hemoglobin A1C, microalbuminuria, and CBC in 3 months. DM2 diagnosed in August 2024 ago, last A1c 12/2/24 - Next A1c due by end of Feb 2024 or beginning of March 2024    Obesity   Current regimen includes metformin and semaglutide. Patient's current weight reported as 235 lbs. Has lost 22 lbs or ~10% of TBW in the past 4 months. Semaglutide is now at a therapeutic dose, but will continue to be titrated as tolerated.    Medication Changes: Continue semaglutide titration as in the above diabetes plan    Future Considerations:  Depending on tolerability - could switch from semaglutide to Tirzepatide  Ozempic titration    Education:  Diet:   Patient is well educated about her diet and since the diagnosis of diabetes a few weeks ago she has been working to minimize carbs and sugar where she can. The patient states she's implementing more greens and trying to decrease her rice and pasta weekly consumption.   Exercise  Patient states she has a goal to walk her dog 3 times a week for about 20-30 minutes. Once she has obtained this goal and can keep up this physical activity she will increase her exercise further    Monitoring  All questions and concerns addressed. Contact pharmacist with any further questions or concerns prior to next appointment.   Reinforced healthy diet, lifestyle, and exercise.    Clinical Pharmacist follow-up: in 3 weeks; Telehealth visit  Upcoming Appointments:   PCP: 3/7/25  Nutrition 1/17/25    Continue all meds under the continuation of  care with the referring provider and clinical pharmacy team.     Thank you,  Eda Ramon, PharmD   Clinical Pharmacist Specialist, Primary Care   Phone: [phone]   Fax: [phone]   Email: [email or web address]    Verbal consent to manage patient's drug therapy was obtained from the patient. They were informed they may decline to participate or withdraw from participation in pharmacy services at any time

## 2025-02-16 DIAGNOSIS — I10 PRIMARY HYPERTENSION: ICD-10-CM

## 2025-02-16 DIAGNOSIS — I10 ESSENTIAL (PRIMARY) HYPERTENSION: Primary | ICD-10-CM

## 2025-02-17 RX ORDER — LOSARTAN POTASSIUM 50 MG/1
TABLET ORAL
Qty: 30 TABLET | Refills: 2 | Status: SHIPPED | OUTPATIENT
Start: 2025-02-17

## 2025-02-17 RX ORDER — SPIRONOLACTONE 25 MG/1
25 TABLET ORAL DAILY
Qty: 30 TABLET | Refills: 3 | Status: SHIPPED | OUTPATIENT
Start: 2025-02-17

## 2025-02-19 ENCOUNTER — APPOINTMENT (OUTPATIENT)
Dept: PHARMACY | Facility: HOSPITAL | Age: 55
End: 2025-02-19
Payer: COMMERCIAL

## 2025-02-19 DIAGNOSIS — E11.9 TYPE 2 DIABETES MELLITUS WITHOUT COMPLICATION, WITHOUT LONG-TERM CURRENT USE OF INSULIN (MULTI): Primary | ICD-10-CM

## 2025-02-19 DIAGNOSIS — E66.01 CLASS 3 SEVERE OBESITY DUE TO EXCESS CALORIES WITH SERIOUS COMORBIDITY AND BODY MASS INDEX (BMI) OF 40.0 TO 44.9 IN ADULT: ICD-10-CM

## 2025-02-19 DIAGNOSIS — E66.813 CLASS 3 SEVERE OBESITY DUE TO EXCESS CALORIES WITH SERIOUS COMORBIDITY AND BODY MASS INDEX (BMI) OF 40.0 TO 44.9 IN ADULT: ICD-10-CM

## 2025-02-27 ENCOUNTER — APPOINTMENT (OUTPATIENT)
Dept: PHARMACY | Facility: HOSPITAL | Age: 55
End: 2025-02-27
Payer: COMMERCIAL

## 2025-02-27 DIAGNOSIS — E66.813 CLASS 3 SEVERE OBESITY DUE TO EXCESS CALORIES WITH SERIOUS COMORBIDITY AND BODY MASS INDEX (BMI) OF 40.0 TO 44.9 IN ADULT: ICD-10-CM

## 2025-02-27 DIAGNOSIS — E66.01 CLASS 3 SEVERE OBESITY DUE TO EXCESS CALORIES WITH SERIOUS COMORBIDITY AND BODY MASS INDEX (BMI) OF 40.0 TO 44.9 IN ADULT: ICD-10-CM

## 2025-02-27 DIAGNOSIS — E11.9 CONTROLLED TYPE 2 DIABETES MELLITUS WITHOUT COMPLICATION, WITHOUT LONG-TERM CURRENT USE OF INSULIN (MULTI): ICD-10-CM

## 2025-02-27 DIAGNOSIS — E11.9 TYPE 2 DIABETES MELLITUS WITHOUT COMPLICATION, WITHOUT LONG-TERM CURRENT USE OF INSULIN (MULTI): Primary | ICD-10-CM

## 2025-02-27 ASSESSMENT — ENCOUNTER SYMPTOMS
FATIGUE: 0
WEAKNESS: 0
WEIGHT LOSS: 1
VISUAL CHANGE: 0

## 2025-02-27 NOTE — PROGRESS NOTES
Clinical Pharmacy Appointment    Patient ID: [id number]  Adriana Rangel is a 54 y.o. female who presents for Follow Up appointment. Reason for Referral: DM and Obesity Medication Management  PCP and Referring Prescriber: Anshul Blanc MD   Last visit with PCP: 12/02/24    Subjective     PMH:   No past medical history on file. Below is from chart review  Hammertoe, multiple ADRs, hyperglycemia, Obesity, dyslipidemia, hx of hypokalemia, essential hypertension, migraine, Vitamin D deficiency, osteoarthritis, benign neoplasms breast and skin, chronic pain after a fall, intercostal neuralgia, hypothyroidism    Social Hx:  Never Smoked   Alcohol 0 drinks per week, rarely drinks  Drugs:  Denies illicit drug use, denies use of marijuana     Family Hx:  Mother: HTN, CVA    HPI:   Diabetes  She presents for her follow-up diabetic visit. She has type 2 diabetes mellitus. The initial diagnosis of diabetes was made 2 weeks ago. Her disease course has been stable. There are no hypoglycemic associated symptoms. Associated symptoms include weight loss. Pertinent negatives for diabetes include no chest pain, no fatigue, no foot paresthesias, no foot ulcerations, no polyuria, no visual change and no weakness. There are no hypoglycemic complications. Symptoms are stable. There are no diabetic complications. Risk factors for coronary artery disease include dyslipidemia, family history, obesity, hypertension, diabetes mellitus and sedentary lifestyle. Current diabetic treatment includes diet and oral agent (dual therapy). She is compliant with treatment all of the time. She is following a high fat/cholesterol and generally healthy diet. Meal planning includes avoidance of concentrated sweets. She has not had a previous visit with a dietitian. She participates in exercise intermittently. There is no change in her home blood glucose trend. An ACE inhibitor/angiotensin II receptor blocker is being taken. She does not see a  podiatrist.Eye exam is current.     DIABETES MELLITUS type 2 & Weight Loss    Diagnosed with diabetes:  < 1 year (2024) Known diabetic complications: none  Does patient follow with Endocrinology: No  Last optometry exam: < 1 year, sees an eye doctor  Most recent visit in Podiatry: No, recent diagnosis-  to podiatry   patient denies sores or cuts on feet today; has started to wear diabetic socks and uses compression socks     Current diabetic medications include:  Ozempic 1 mg on Tuesdays - 4th dose 2/25/25  No adrs     Past diabetic medications include:  Metformin    Glucose Readings:   Patient does not take her blood sugar at home nor has she ever done this in the past - if home BG readings become neccesary, the patient would require proper education  Patient does not have a glucometer at home    HYPOGLYCEMIA  Any episodes of hypoglycemia? N/A.  Does not take sugars at home  Did patient treat episode of hypoglycemia appropriately? N/A Patient is very low risk for hypoglycemia while on metformin monotherapy   Does the patient have a prescription for ready-to-use Glucagon? Not on insulin    Does pt have proteinuria? No    BMI: >/= 35? Yes, 42 (improved at last visit at 45)    Weight loss since first appointment: 17 lbs or ~10% TBW loss  - Patient does weigh herself at home    Weight  - 2/27/25: 230 lbs  - 1/9/24: 235 lbs  - 12/2/24: 245  - 10/31:/24 250 lbs   - 9/9/24: 262 lbs    Lifestyle:  Nutrition Appt: 1/17/25  Injection/Infusion Sites: abdominal wall.  helps me and injects into my butt check.     Encouraged patient to use tops of thighs or back of arms if she did not want to inject into her abdomen  Eat 1-2 fistfuls non-starchy vegetables at lunch daily. I will utilize my 10 minute morning breaks to get in exercise such as pedal machine, chair exercises, squats.   I will do this at least three times a week.   Provider Impression: Three month follow-up. Has lost 25 pounds thus far. A1c decreased to 6.2%  "down from 6.9%. Feels like she is eating more non-starchy veggies at dinner half the time as well. Reports nausea after having syrup in her cream of wheat last night. Eating lighter foods lately due to getting over illness. Attributes this to Ozempic as she's now off Metformin. This was first time she had 1 mg dosage. She continues to work with pharmacist regarding medications.   She is doing a little more physical activity than she was by walking dog twice a week and sometimes shoveling snow.   Reviewed physical activity recommendations for people with DM. Reminded her of handout given at first visit with free you tube exercise videos.   Discussed importance of muscle strengthening when taking Ozempic which causes weight loss, but some muscle mass is lost as well. Barrier to exercising is that she's tired at end of work day 5-6 pm. She does work from home several days per week. Discussed looking for ways she can grab 10-15 minutes during work day to do small intervals of activity. Admits she's a morning person and that time would be better.   Identified 10 minute break in the mornings. When working at home she can use pedal machine, do chair exercises, resistance bands or squats. Said she can walk the atrium at work for 10 min on Tuesdays and Thursdays.   She was recently sick and was interested to discuss sick day guidelines.  The referring provider is within the same EMR and is able to see the DSMES provided and the outcomes.   Nutrition 1/31/25  Called two weeks to follow up on new goal she set. Goal she set \"at least 3 times per week I will utilize 10 min morning work break to get in exercise such as pedal machine, chair exercises, squats.  She is meeting this goal.   Additionally tries to go up and down the stairs a few times when home from work. This is new. She did fall on her knee when shoveling snow so is taking it slow going up and down stairs. Encouraged her to gauge her pain level and do what she can. " Reminded her of long term exercise goals and how to gradually increase what she is doing. The referring provider is within the same EMR and is able to see the DSMES provided and the outcomes.   Improved diet  Diet: 3 meals/day  BK: 2 scrambled eggs with olive oil or butter, cream of wheat, oatmeal packets (rarely) Pentecostal oats flavored oatmeal variety pack  LN: Salad (rarely with croutons does not use much dressing), or soup. Since the new DM diagnoses she states she is trying to stop eating sandwiches and bread in general  DN: Beef, potatoes are minimized, broccoli as a side, rice and or pasta ~3 days a week  Snacks: no longer snacking as she was told that she was not supposed snack anymore, but has on occasion ate celery and carrots, broccoli, or peanuts    Drinks: is only drinking water now, if she wants a flavored beverage she will have an iced tea with no sugar added (~3x a week)  Good compliance with carbohydrate counting, high fat food choices, and is trying to change her lifestyle habits. The patient exhibits a high level of understanding and knowledge regarding a diabetic diet and the benefits of increased exercise.   Physical Activity:   Since last appt the patient bought home work out equipment  Bike pedal home: she uses this on both her arms and legs x 3 times a week  Attempting to increase walking, walking dog (great domi) - 3x a week for 20-30 minutes,  usually walks the dog on other days  weather has impeded her outside walks, but tries to do as 3 times a week but lately has not     Secondary Prevention:  Statin? No   The 10-year ASCVD risk score (Yeimi CHRISTIANSEN, et al., 2019) is: 4.9%    Values used to calculate the score:      Age: 54 years      Sex: Female      Is Non- : No      Diabetic: Yes      Tobacco smoker: No      Systolic Blood Pressure: 110 mmHg      Is BP treated: Yes      HDL Cholesterol: 41 mg/dL      Total Cholesterol: 195 mg/dL  ACE-I/ARB? Yes    Pertinent PMH  Review:  PMH of Pancreatitis: No  PMH of Retinopathy: No  PMH of Urinary Tract Infections: Yes, prior but no longer than~1 year ago  PMH of MTC: No  HF: No  _____________________________________________________________________________    Allergies   Reviewed? Yes  Changes? No    Medication Reconciliation:  OTCs?     Drug Interactions  The following drug interactions were noted: aspirin and HTN    Medication System Management  Patient's preferred pharmacy: [facility]  Adherence/Medication Access:   Have you had any missed doses? No  $25 copay    _____________________________________________________________________________    Objective   Allergies   Allergen Reactions    Erythromycin Base Hives    Lisinopril Cough    Sulfa (Sulfonamide Antibiotics) Hives    Latex Rash     Social History     Social History Narrative    Not on file      Medication Review  Current Outpatient Medications   Medication Instructions    amLODIPine (NORVASC) 5 mg, oral, Daily    ascorbic acid (VITAMIN C) 500 mg, Daily    aspirin 325 mg, oral, As needed    calcium carbonate/vitamin D3 (CALCIUM 500 + D, D3, ORAL) Take by mouth.    chlorthalidone (HYGROTON) 25 mg, oral, Daily, Takes an additional tablet if she has edema and if the swelling is not relieved by this second tablet she was directed to call her PCP    cholecalciferol (VITAMIN D-3) 50 mcg, Daily    levothyroxine (SYNTHROID, LEVOXYL) 50 mcg, oral, Daily    losartan (Cozaar) 50 mg tablet TAKE 1 TABLET BY MOUTH EVERY DAY    melatonin 5 mg, Nightly PRN    mv-calcium-min-iron fm-FA-vitK (Multi For Her) 18 mg iron-600 mcg-80 mcg tablet 1 tablet, Daily    Ozempic 1 mg, subcutaneous, Weekly    rosuvastatin (CRESTOR) 5 mg, oral, Daily    saccharomyces boulardii (FLORASTOR) 250 mg, Daily    semaglutide (OZEMPIC) 1 mg, subcutaneous, Weekly    spironolactone (ALDACTONE) 25 mg, oral, Daily      Vitals  BP Readings from Last 2 Encounters:   12/02/24 110/68   08/21/24 110/62     Labs  A1C  Lab Results    Component Value Date    HGBA1C 6.2 (H) 12/02/2024    HGBA1C 6.9 (H) 08/06/2024    HGBA1C 6.2 08/16/2023     BMP  Lab Results   Component Value Date    CALCIUM 9.9 12/02/2024     12/02/2024    K 3.5 12/02/2024    CO2 31 12/02/2024    CL 97 (L) 12/02/2024    BUN 21 12/02/2024    CREATININE 1.26 (H) 12/02/2024    EGFR 51 (L) 12/02/2024     LFTs  Lab Results   Component Value Date    ALT 38 12/02/2024    AST 23 12/02/2024    ALKPHOS 86 12/02/2024    BILITOT 0.7 12/02/2024     FLP  Lab Results   Component Value Date    TRIG 148 12/02/2024    CHOL 195 12/02/2024    LDLF 117 (H) 04/27/2023    LDLCALC 124 (H) 12/02/2024    HDL 41.0 12/02/2024     Weight Management  Wt Readings from Last 3 Encounters:   12/02/24 111 kg (245 lb 12.8 oz)   09/09/24 119 kg (262 lb 5.6 oz)   08/21/24 119 kg (262 lb)    _____________________________________________________________________________    Assessment/Plan        Patient's Personal Goals of Therapy:  Be healthier  Spend more time with family  Extend quality and quantity of life  Decrease the amount of required daily medications     DIABETES MELLITUS TYPE 2 & OBESITY PLAN:  A1c: less than <6%  BMI < 25  Decrease LDL-C by 30-50%  LDL-C <70  Improve diet and increase exercise    Diabetes mellitus Type II, under satisfactory control.  The patient has just been diagnosed with type 2 diabetes. Her A1c has worsened over the past few years, but improved to 6.3 on 12/2/24 since October (6.9) . Has had x4  doses of Ozempic 1 mg but as she had issues with insurance and missed taking her medication for 1.5 weeks, we will continue with 1 mg for one more month. Patient denies adrs and said she feels good and endorses increased satiety. Patient's current weight reported as 230 lbs. Has lost 27 lbs or ~11% of TBW in the past 4 months. Semaglutide is now at a therapeutic dose, but will continue to be titrated as tolerated.    Plan:   CONTINUE: Semaglutide 1 mg once weekly   Denies adrs  Further  diabetes treatment, CVD/renal protection, and weight loss    Rationale:   Candidate for GLP? Yes  Candidate for SGLT2? Potentially in the future, due to her history of UTIs, this may not be the ideal choice; but may be an effective add on therapy to a GLP and metformin  Candidate for DPP4? No, not if the patient starts a GLP-1  Candidate for Metformin? ADRs with metformin and renal dysfunction - discontinuing now on semaglutide 1 mg   Candidate for Insulin? No; A1c >/= 11? No    Completed Today  Discussed general issues about diabetes pathophysiology and management.  Counseling at today's visit: focused on the need for regular aerobic exercise and discussed the advantages of a diet low in carbohydrates.  Encouraged aerobic exercise.  Discussed foot care.  Continue semaglutide titration  Labs: fasting blood sugar, fasting lipid panel, hemoglobin A1C, microalbuminuria, and CBC in 3 months. DM2 diagnosed in August 2024 ago, last A1c 12/2/24 - Next A1c due by end of Feb 2024 or beginning of March 2024    Future Considerations:  Depending on tolerability - could switch from semaglutide to Tirzepatide  Ozempic titration    Education:  Diet:   Patient is well educated about her diet and since the diagnosis of diabetes a few weeks ago she has been working to minimize carbs and sugar where she can. The patient states she's implementing more greens and trying to decrease her rice and pasta weekly consumption.   Exercise  Patient states she has a goal to walk her dog 3 times a week for about 20-30 minutes. Once she has obtained this goal and can keep up this physical activity she will increase her exercise further    Monitoring  All questions and concerns addressed. Contact pharmacist with any further questions or concerns prior to next appointment.   Reinforced healthy diet, lifestyle, and exercise.    Clinical Pharmacist follow-up: in 3 weeks; Telehealth visit  Upcoming Appointments:   PCP: 3/7/25    Continue all meds under  the continuation of care with the referring provider and clinical pharmacy team.     Thank you,  Eda Ramon, PharmD   Clinical Pharmacist Specialist, Primary Care   Phone: [phone]   Fax: [phone]   Email: [email or web address]    Verbal consent to manage patient's drug therapy was obtained from the patient. They were informed they may decline to participate or withdraw from participation in pharmacy services at any time

## 2025-03-07 ENCOUNTER — APPOINTMENT (OUTPATIENT)
Dept: PRIMARY CARE | Facility: CLINIC | Age: 55
End: 2025-03-07
Payer: COMMERCIAL

## 2025-03-07 VITALS
TEMPERATURE: 97.3 F | HEIGHT: 64 IN | WEIGHT: 232.2 LBS | HEART RATE: 79 BPM | BODY MASS INDEX: 39.64 KG/M2 | SYSTOLIC BLOOD PRESSURE: 110 MMHG | OXYGEN SATURATION: 95 % | RESPIRATION RATE: 16 BRPM | DIASTOLIC BLOOD PRESSURE: 62 MMHG

## 2025-03-07 DIAGNOSIS — H60.21 ACUTE MALIGNANT OTITIS EXTERNA OF RIGHT EAR: ICD-10-CM

## 2025-03-07 DIAGNOSIS — E11.9 CONTROLLED TYPE 2 DIABETES MELLITUS WITHOUT COMPLICATION, WITHOUT LONG-TERM CURRENT USE OF INSULIN (MULTI): Primary | ICD-10-CM

## 2025-03-07 DIAGNOSIS — I10 PRIMARY HYPERTENSION: ICD-10-CM

## 2025-03-07 DIAGNOSIS — E83.52 HYPERCALCEMIA: ICD-10-CM

## 2025-03-07 DIAGNOSIS — H60.312 ACUTE DIFFUSE OTITIS EXTERNA OF LEFT EAR: ICD-10-CM

## 2025-03-07 DIAGNOSIS — E03.9 HYPOTHYROIDISM, ADULT: ICD-10-CM

## 2025-03-07 PROBLEM — R73.9 HIGH BLOOD SUGAR: Status: RESOLVED | Noted: 2023-02-03 | Resolved: 2025-03-07

## 2025-03-07 LAB
ALBUMIN SERPL-MCNC: 4.6 G/DL (ref 3.6–5.1)
ALP SERPL-CCNC: 95 U/L (ref 37–153)
ALT SERPL-CCNC: 14 U/L (ref 6–29)
ANION GAP SERPL CALCULATED.4IONS-SCNC: 8 MMOL/L (CALC) (ref 7–17)
AST SERPL-CCNC: 12 U/L (ref 10–35)
BILIRUB SERPL-MCNC: 0.5 MG/DL (ref 0.2–1.2)
BUN SERPL-MCNC: 23 MG/DL (ref 7–25)
CALCIUM SERPL-MCNC: 11.1 MG/DL (ref 8.6–10.4)
CHLORIDE SERPL-SCNC: 99 MMOL/L (ref 98–110)
CHOLEST SERPL-MCNC: 124 MG/DL
CHOLEST/HDLC SERPL: 3.3 (CALC)
CO2 SERPL-SCNC: 34 MMOL/L (ref 20–32)
CREAT SERPL-MCNC: 1.09 MG/DL (ref 0.5–1.03)
EGFRCR SERPLBLD CKD-EPI 2021: 60 ML/MIN/1.73M2
EST. AVERAGE GLUCOSE BLD GHB EST-MCNC: 111 MG/DL
EST. AVERAGE GLUCOSE BLD GHB EST-SCNC: 6.2 MMOL/L
GLUCOSE SERPL-MCNC: 118 MG/DL (ref 65–99)
HBA1C MFR BLD: 5.5 % OF TOTAL HGB
HDLC SERPL-MCNC: 38 MG/DL
LDLC SERPL CALC-MCNC: 63 MG/DL (CALC)
NONHDLC SERPL-MCNC: 86 MG/DL (CALC)
POTASSIUM SERPL-SCNC: 4.1 MMOL/L (ref 3.5–5.3)
PROT SERPL-MCNC: 7.1 G/DL (ref 6.1–8.1)
SODIUM SERPL-SCNC: 141 MMOL/L (ref 135–146)
TRIGL SERPL-MCNC: 149 MG/DL

## 2025-03-07 PROCEDURE — 3008F BODY MASS INDEX DOCD: CPT | Performed by: INTERNAL MEDICINE

## 2025-03-07 PROCEDURE — 3078F DIAST BP <80 MM HG: CPT | Performed by: INTERNAL MEDICINE

## 2025-03-07 PROCEDURE — 3074F SYST BP LT 130 MM HG: CPT | Performed by: INTERNAL MEDICINE

## 2025-03-07 PROCEDURE — 99214 OFFICE O/P EST MOD 30 MIN: CPT | Performed by: INTERNAL MEDICINE

## 2025-03-07 PROCEDURE — 4010F ACE/ARB THERAPY RXD/TAKEN: CPT | Performed by: INTERNAL MEDICINE

## 2025-03-07 PROCEDURE — 1036F TOBACCO NON-USER: CPT | Performed by: INTERNAL MEDICINE

## 2025-03-07 RX ORDER — CIPROFLOXACIN AND DEXAMETHASONE 3; 1 MG/ML; MG/ML
4 SUSPENSION/ DROPS AURICULAR (OTIC) 2 TIMES DAILY
Qty: 7.5 ML | Refills: 0 | Status: SHIPPED | OUTPATIENT
Start: 2025-03-07 | End: 2025-03-14

## 2025-03-07 ASSESSMENT — ENCOUNTER SYMPTOMS
PALPITATIONS: 0
CHILLS: 0
ABDOMINAL PAIN: 0
SHORTNESS OF BREATH: 0
NAUSEA: 0
UNEXPECTED WEIGHT CHANGE: 0
WEAKNESS: 0
COUGH: 0
SLEEP DISTURBANCE: 0
FATIGUE: 0
VOMITING: 0
DIZZINESS: 0
WHEEZING: 0
SORE THROAT: 0
CHEST TIGHTNESS: 0
ARTHRALGIAS: 0
JOINT SWELLING: 0
DIARRHEA: 0
CONFUSION: 0
DYSURIA: 0
ADENOPATHY: 0
CONSTIPATION: 0

## 2025-03-07 NOTE — ASSESSMENT & PLAN NOTE
New, pt on ozempic  Will recheck, get pth, vitd , stop all vit d and calcium  Will check thyroid us

## 2025-03-07 NOTE — PATIENT INSTRUCTIONS
Was nice seeing you today.  Continue same medication.  Have lab work done before next appointment if labs were ordered today.  Fu in 3 month.  Call/ contact our office with any concerns.    If you have labs or test done and you can't see the report in your chart or you didn't hear from us in 2 weeks after test/labs done , please, call our office for reports.  Please , do not assume that they were normal.    Any test results  and questions you might have , will be discussed at next visit -- please make sure to make a follow up appt after testing if reports are abnormal or you have questions.  Avoid taking nephrotoxic medication , NSAIDS like ibuprofen, naproxen,  increase po fluids intake, have your blood pressure well controlled, fu as advised.  Recheck labs in 1 month

## 2025-03-07 NOTE — ASSESSMENT & PLAN NOTE
Condition stable, controlled with current medication, no medication side effects, continue same treatment,call if any  new symptoms develops. Follow up with specialty service as needed or advised.  Hba1c 5.5  On ozempic, not on metformin due to N. Lost 30 lbs

## 2025-03-07 NOTE — PROGRESS NOTES
Ton Rangel is a 54 y.o. female who presents for Follow-up (3 months  follow  up on DM).  3 months  follow  up DM,last HGBA1C was 5.5 on 3.6.2025   Not checking BG  at  home  Labs - 3.2025    Eye  exam- 11.22.2024  L ear pain  x 2 days , feels balance little off , states she  has  ear infection often , did  not  take  anything TX  yet .  Labs and test reviewed with patient , all question answered, further evaluation, if needed , discussed.          Review of Systems   Constitutional:  Negative for chills, fatigue and unexpected weight change.        Comment   HENT:  Negative for congestion, ear pain and sore throat.    Respiratory:  Negative for cough, chest tightness, shortness of breath and wheezing.    Cardiovascular:  Negative for palpitations and leg swelling.   Gastrointestinal:  Negative for abdominal pain, constipation, diarrhea, nausea and vomiting.   Genitourinary:  Negative for dysuria and urgency.   Musculoskeletal:  Negative for arthralgias and joint swelling.   Skin:  Negative for rash.   Neurological:  Negative for dizziness and weakness.   Hematological:  Negative for adenopathy.   Psychiatric/Behavioral:  Negative for confusion and sleep disturbance.        Objective   Physical Exam  Constitutional:       Appearance: Normal appearance.      Comments: No thyroid nodules  Left ear external canal redness, normal tympanic membrane both ear   HENT:      Head: Normocephalic and atraumatic.   Eyes:      Pupils: Pupils are equal, round, and reactive to light.   Cardiovascular:      Rate and Rhythm: Normal rate and regular rhythm.   Pulmonary:      Effort: Pulmonary effort is normal.      Breath sounds: Normal breath sounds.   Musculoskeletal:         General: Normal range of motion.      Cervical back: Normal range of motion and neck supple.   Skin:     General: Skin is warm.   Neurological:      General: No focal deficit present.      Mental Status: She is alert and oriented to person,  "place, and time.   Psychiatric:         Mood and Affect: Mood normal.         Behavior: Behavior normal.       /62 (BP Location: Left arm, Patient Position: Sitting)   Pulse 79   Temp 36.3 °C (97.3 °F)   Resp 16   Ht 1.626 m (5' 4\")   Wt 105 kg (232 lb 3.2 oz)   SpO2 95%   BMI 39.86 kg/m²     Lab Results   Component Value Date    GLUCOSE 118 (H) 03/06/2025    CALCIUM 11.1 (H) 03/06/2025     03/06/2025    K 4.1 03/06/2025    CO2 34 (H) 03/06/2025    CL 99 03/06/2025    BUN 23 03/06/2025    CREATININE 1.09 (H) 03/06/2025     Lab Results   Component Value Date    CALCIUM 11.1 (H) 03/06/2025     Lab Results   Component Value Date    CHOL 124 03/06/2025    CHOL 195 12/02/2024    CHOL 202 (H) 08/06/2024     Lab Results   Component Value Date    HDL 38 (L) 03/06/2025    HDL 41.0 12/02/2024    HDL 43.8 08/06/2024     Lab Results   Component Value Date    LDLCALC 63 03/06/2025    LDLCALC 124 (H) 12/02/2024    LDLCALC 136 (H) 08/06/2024     Lab Results   Component Value Date    TRIG 149 03/06/2025    TRIG 148 12/02/2024    TRIG 112 08/06/2024     No components found for: \"CHOLHDL\"  Lab Results   Component Value Date    ALT 14 03/06/2025    AST 12 03/06/2025    ALKPHOS 95 03/06/2025    BILITOT 0.5 03/06/2025     Lab Results   Component Value Date    HGBA1C 5.5 03/06/2025     Lab Results   Component Value Date    TSH 2.45 08/06/2024       Assessment/Plan   Problem List Items Addressed This Visit       HBP (high blood pressure) - Primary    Hypothyroidism, adult    Relevant Orders    Vitamin B12    TSH with reflex to Free T4 if abnormal    US thyroid    Controlled type 2 diabetes mellitus without complication, without long-term current use of insulin (Multi)     Condition stable, controlled with current medication, no medication side effects, continue same treatment,call if any  new symptoms develops. Follow up with specialty service as needed or advised.  Hba1c 5.5  On ozempic, not on metformin due to N. Lost " 30 lbs         Relevant Orders    Albumin-Creatinine Ratio, Urine Random    Albumin-Creatinine Ratio, Urine Random    Hypercalcemia     New, pt on ozempic  Will recheck, get pth, vitd , stop all vit d and calcium  Will check thyroid us           Relevant Orders    Parathyroid Hormone, Intact    Vitamin D 25-Hydroxy,Total (for eval of Vitamin D levels)    US thyroid    Comprehensive Metabolic Panel    Parathyroid Hormone, Intact

## 2025-03-08 LAB
25(OH)D3+25(OH)D2 SERPL-MCNC: 85 NG/ML (ref 30–100)
ALBUMIN SERPL-MCNC: 4.6 G/DL (ref 3.6–5.1)
ALP SERPL-CCNC: 94 U/L (ref 37–153)
ALT SERPL-CCNC: 13 U/L (ref 6–29)
ANION GAP SERPL CALCULATED.4IONS-SCNC: 14 MMOL/L (CALC) (ref 7–17)
AST SERPL-CCNC: 13 U/L (ref 10–35)
BILIRUB SERPL-MCNC: 0.5 MG/DL (ref 0.2–1.2)
BUN SERPL-MCNC: 17 MG/DL (ref 7–25)
CALCIUM SERPL-MCNC: 10.2 MG/DL (ref 8.6–10.4)
CHLORIDE SERPL-SCNC: 99 MMOL/L (ref 98–110)
CO2 SERPL-SCNC: 29 MMOL/L (ref 20–32)
CREAT SERPL-MCNC: 1.14 MG/DL (ref 0.5–1.03)
EGFRCR SERPLBLD CKD-EPI 2021: 57 ML/MIN/1.73M2
GLUCOSE SERPL-MCNC: 84 MG/DL (ref 65–99)
POTASSIUM SERPL-SCNC: 4 MMOL/L (ref 3.5–5.3)
PROT SERPL-MCNC: 7 G/DL (ref 6.1–8.1)
PTH-INTACT SERPL-MCNC: 12 PG/ML (ref 16–77)
SODIUM SERPL-SCNC: 142 MMOL/L (ref 135–146)
TSH SERPL-ACNC: 2.1 MIU/L
TSH SERPL-ACNC: 2.19 MIU/L
VIT B12 SERPL-MCNC: 370 PG/ML (ref 200–1100)

## 2025-03-20 ENCOUNTER — APPOINTMENT (OUTPATIENT)
Dept: PHARMACY | Facility: HOSPITAL | Age: 55
End: 2025-03-20
Payer: COMMERCIAL

## 2025-03-20 DIAGNOSIS — E11.9 TYPE 2 DIABETES MELLITUS WITHOUT COMPLICATION, WITHOUT LONG-TERM CURRENT USE OF INSULIN (MULTI): Primary | ICD-10-CM

## 2025-03-20 ASSESSMENT — ENCOUNTER SYMPTOMS
VISUAL CHANGE: 0
FATIGUE: 0
WEIGHT LOSS: 1
WEAKNESS: 0

## 2025-03-20 NOTE — Clinical Note
Patient would like to follow with PCP and wait for unrelated labs prior to changing any medications. Per patient, they will increase dose if patient continues to plateau with weight loss. Patient welcomed to call if she needs anything. If not she can continue to follow with PCP

## 2025-03-20 NOTE — PROGRESS NOTES
Clinical Pharmacy Appointment    Patient ID: [id number]  Adriana Rangel is a 54 y.o. female who presents for Follow Up appointment. Reason for Referral: DM and Obesity Medication Management  PCP and Referring Prescriber: Anshul Blanc MD   Last visit with PCP: 12/02/24    Subjective     PMH:   No past medical history on file. Below is from chart review  Hammertoe, multiple ADRs, hyperglycemia, Obesity, dyslipidemia, hx of hypokalemia, essential hypertension, migraine, Vitamin D deficiency, osteoarthritis, benign neoplasms breast and skin, chronic pain after a fall, intercostal neuralgia, hypothyroidism    Social Hx:  Never Smoked   Alcohol 0 drinks per week, rarely drinks  Drugs:  Denies illicit drug use, denies use of marijuana     Family Hx:  Mother: HTN, CVA    HPI:   Diabetes  She presents for her follow-up diabetic visit. She has type 2 diabetes mellitus. The initial diagnosis of diabetes was made 2 weeks ago. Her disease course has been stable. There are no hypoglycemic associated symptoms. Associated symptoms include weight loss. Pertinent negatives for diabetes include no chest pain, no fatigue, no foot paresthesias, no foot ulcerations, no polyuria, no visual change and no weakness. There are no hypoglycemic complications. Symptoms are stable. There are no diabetic complications. Risk factors for coronary artery disease include dyslipidemia, family history, obesity, hypertension, diabetes mellitus and sedentary lifestyle. Current diabetic treatment includes diet and oral agent (dual therapy). She is compliant with treatment all of the time. She is following a high fat/cholesterol and generally healthy diet. Meal planning includes avoidance of concentrated sweets. She has not had a previous visit with a dietitian. She participates in exercise intermittently. There is no change in her home blood glucose trend. An ACE inhibitor/angiotensin II receptor blocker is being taken. She does not see a  podiatrist.Eye exam is current.     DIABETES MELLITUS type 2 & Weight Loss    Diagnosed with diabetes:  < 1 year (2024) Known diabetic complications: none  Does patient follow with Endocrinology: No  Last optometry exam: < 1 year, sees an eye doctor  Most recent visit in Podiatry: No, recent diagnosis-  to podiatry   patient denies sores or cuts on feet today; has started to wear diabetic socks and uses compression socks     Current diabetic medications include:  Ozempic 1 mg on Tuesdays - 4  No adrs     Past diabetic medications include:  Metformin    Glucose Readings:   Patient does not take her blood sugar at home nor has she ever done this in the past - if home BG readings become neccesary, the patient would require proper education  Patient does not have a glucometer at home    HYPOGLYCEMIA  Any episodes of hypoglycemia? N/A.  Does not take sugars at home  Did patient treat episode of hypoglycemia appropriately? N/A Patient is very low risk for hypoglycemia while on metformin monotherapy   Does the patient have a prescription for ready-to-use Glucagon? Not on insulin    Does pt have proteinuria? No    BMI: >/= 35? Yes, 42 (improved at last visit at 45)    Weight loss since first appointment: 17 lbs or ~10% TBW loss  - Patient does weigh herself at home    Weight  -  3/20/24  - 2/27/25: 230 lbs  - 1/9/24: 235 lbs  - 12/2/24: 245  - 10/31:/24 250 lbs   - 9/9/24: 262 lbs    Lifestyle:  Nutrition Appt: 1/17/25  Injection/Infusion Sites: abdominal wall.  helps me and injects into my butt check.     Encouraged patient to use tops of thighs or back of arms if she did not want to inject into her abdomen  Eat 1-2 fistfuls non-starchy vegetables at lunch daily. I will utilize my 10 minute morning breaks to get in exercise such as pedal machine, chair exercises, squats.   I will do this at least three times a week.   Provider Impression: Three month follow-up. Has lost 25 pounds thus far. A1c decreased to 6.2% down  "from 6.9%. Feels like she is eating more non-starchy veggies at dinner half the time as well. Reports nausea after having syrup in her cream of wheat last night. Eating lighter foods lately due to getting over illness. Attributes this to Ozempic as she's now off Metformin. This was first time she had 1 mg dosage. She continues to work with pharmacist regarding medications.   She is doing a little more physical activity than she was by walking dog twice a week and sometimes shoveling snow.   Reviewed physical activity recommendations for people with DM. Reminded her of handout given at first visit with free you tube exercise videos.   Discussed importance of muscle strengthening when taking Ozempic which causes weight loss, but some muscle mass is lost as well. Barrier to exercising is that she's tired at end of work day 5-6 pm. She does work from home several days per week. Discussed looking for ways she can grab 10-15 minutes during work day to do small intervals of activity. Admits she's a morning person and that time would be better.   Identified 10 minute break in the mornings. When working at home she can use pedal machine, do chair exercises, resistance bands or squats. Said she can walk the atrium at work for 10 min on Tuesdays and Thursdays.   She was recently sick and was interested to discuss sick day guidelines.  The referring provider is within the same EMR and is able to see the DSMES provided and the outcomes.   Nutrition 1/31/25  Called two weeks to follow up on new goal she set. Goal she set \"at least 3 times per week I will utilize 10 min morning work break to get in exercise such as pedal machine, chair exercises, squats.  She is meeting this goal.   Additionally tries to go up and down the stairs a few times when home from work. This is new. She did fall on her knee when shoveling snow so is taking it slow going up and down stairs. Encouraged her to gauge her pain level and do what she can. " Reminded her of long term exercise goals and how to gradually increase what she is doing. The referring provider is within the same EMR and is able to see the DSMES provided and the outcomes.   Improved diet  Diet: 3 meals/day  BK: 2 scrambled eggs with olive oil or butter, cream of wheat, oatmeal packets (rarely) Denominational oats flavored oatmeal variety pack  LN: Salad (rarely with croutons does not use much dressing), or soup. Since the new DM diagnoses she states she is trying to stop eating sandwiches and bread in general  DN: Beef, potatoes are minimized, broccoli as a side, rice and or pasta ~3 days a week  Snacks: no longer snacking as she was told that she was not supposed snack anymore, but has on occasion ate celery and carrots, broccoli, or peanuts    Drinks: is only drinking water now, if she wants a flavored beverage she will have an iced tea with no sugar added (~3x a week)  Good compliance with carbohydrate counting, high fat food choices, and is trying to change her lifestyle habits. The patient exhibits a high level of understanding and knowledge regarding a diabetic diet and the benefits of increased exercise.   Physical Activity:   Since last appt the patient bought home work out equipment  Bike pedal home: she uses this on both her arms and legs x 3 times a week  Attempting to increase walking, walking dog ([employer] dom[employer]) - 3x a week for 20-30 minutes,  usually walks the dog on other days  weather has impeded her outside walks, but tries to do as 3 times a week but lately has not     Secondary Prevention:  Statin? No   The ASCVD Risk score ([place] DK, et al., 2019) failed to calculate for the following reasons:    The valid total cholesterol range is 130 to 320 mg/dL  ACE-I/ARB? Yes    Pertinent PMH Review:  PMH of Pancreatitis: No  PMH of Retinopathy: No  PMH of Urinary Tract Infections: Yes, prior but no longer than~1 year ago  PMH of MTC: No  HF:  No  _____________________________________________________________________________    Allergies   Reviewed? Yes  Changes? No    Medication Reconciliation:  OTCs?     Drug Interactions  The following drug interactions were noted: aspirin and HTN    Medication System Management  Patient's preferred pharmacy: [facility]  Adherence/Medication Access:   Have you had any missed doses? No  $25 copay    _____________________________________________________________________________    Objective   Allergies   Allergen Reactions    Erythromycin Base Hives    Lisinopril Cough    Sulfa (Sulfonamide Antibiotics) Hives    Latex Rash     Medication Review  Current Outpatient Medications   Medication Instructions    amLODIPine (NORVASC) 5 mg, oral, Daily    ascorbic acid (VITAMIN C) 500 mg, Daily    aspirin 325 mg, As needed    calcium carbonate/vitamin D3 (CALCIUM 500 + D, D3, ORAL) Take by mouth.    chlorthalidone (HYGROTON) 25 mg, oral, Daily, Takes an additional tablet if she has edema and if the swelling is not relieved by this second tablet she was directed to call her PCP    cholecalciferol (VITAMIN D-3) 50 mcg, Daily    levothyroxine (SYNTHROID, LEVOXYL) 50 mcg, oral, Daily    losartan (Cozaar) 50 mg tablet TAKE 1 TABLET BY MOUTH EVERY DAY    melatonin 5 mg, Nightly PRN    mv-calcium-min-iron fm-FA-vitK (Multi For Her) 18 mg iron-600 mcg-80 mcg tablet 1 tablet, Daily    rosuvastatin (CRESTOR) 5 mg, oral, Daily    saccharomyces boulardii (FLORASTOR) 250 mg, Daily    semaglutide (OZEMPIC) 1 mg, subcutaneous, Weekly    spironolactone (ALDACTONE) 25 mg, oral, Daily      Vitals  BP Readings from Last 2 Encounters:   03/07/25 110/62   12/02/24 110/68     Labs  A1C  Lab Results   Component Value Date    HGBA1C 5.5 03/06/2025    HGBA1C 6.2 (H) 12/02/2024    HGBA1C 6.9 (H) 08/06/2024     BMP  Lab Results   Component Value Date    CALCIUM 10.2 03/07/2025     03/07/2025    K 4.0 03/07/2025    CO2 29 03/07/2025    CL 99 03/07/2025    BUN  17 03/07/2025    CREATININE 1.14 (H) 03/07/2025    EGFR 57 (L) 03/07/2025     LFTs  Lab Results   Component Value Date    ALT 13 03/07/2025    AST 13 03/07/2025    ALKPHOS 94 03/07/2025    BILITOT 0.5 03/07/2025     FLP  Lab Results   Component Value Date    TRIG 149 03/06/2025    CHOL 124 03/06/2025    LDLF 117 (H) 04/27/2023    LDLCALC 63 03/06/2025    HDL 38 (L) 03/06/2025     Weight Management  Wt Readings from Last 3 Encounters:   03/07/25 105 kg (232 lb 3.2 oz)   12/02/24 111 kg (245 lb 12.8 oz)   09/09/24 119 kg (262 lb 5.6 oz)    _____________________________________________________________________________    Assessment/Plan       DIABETES MELLITUS TYPE 2 & OBESITY PLAN:    Patient would like to follow with PCP and wait for unrelated labs prior to changing any medications. Per patient, they will increase dose if patient continues to plateau with weight loss. Patient welcomed to call if she needs anything. If not she can continue to follow with PCP      Plan:   CONTINUE: Semaglutide 1 mg once weekly   Denies adrs  Further diabetes treatment, CVD/renal protection, and weight loss    Rationale:   Candidate for GLP? Yes  Candidate for SGLT2? Potentially in the future, due to her history of UTIs, this may not be the ideal choice; but may be an effective     Monitoring  All questions and concerns addressed. Contact pharmacist with any further questions or concerns prior to next appointment.   Reinforced healthy diet, lifestyle, and exercise.    Clinical Pharmacist follow-up: none    Continue all meds under the continuation of care with the referring provider and clinical pharmacy team.    Thank you,  Eda Ramon PharmD   Clinical Pharmacist Specialist, Primary Care   Phone: [phone]   Fax: [phone]   Email: [email or web address]    Verbal consent to manage patient's drug therapy was obtained from the patient. They were informed they may decline to participate or withdraw from participation in  pharmacy services at any time

## 2025-04-03 LAB
ALBUMIN SERPL-MCNC: 4.4 G/DL (ref 3.6–5.1)
ALP SERPL-CCNC: 95 U/L (ref 37–153)
ALT SERPL-CCNC: 13 U/L (ref 6–29)
ANION GAP SERPL CALCULATED.4IONS-SCNC: 11 MMOL/L (CALC) (ref 7–17)
AST SERPL-CCNC: 11 U/L (ref 10–35)
BILIRUB SERPL-MCNC: 0.4 MG/DL (ref 0.2–1.2)
BUN SERPL-MCNC: 16 MG/DL (ref 7–25)
CALCIUM SERPL-MCNC: 9.4 MG/DL (ref 8.6–10.4)
CHLORIDE SERPL-SCNC: 99 MMOL/L (ref 98–110)
CO2 SERPL-SCNC: 29 MMOL/L (ref 20–32)
CREAT SERPL-MCNC: 0.96 MG/DL (ref 0.5–1.03)
EGFRCR SERPLBLD CKD-EPI 2021: 69 ML/MIN/1.73M2
GLUCOSE SERPL-MCNC: 113 MG/DL (ref 65–99)
POTASSIUM SERPL-SCNC: 3.7 MMOL/L (ref 3.5–5.3)
PROT SERPL-MCNC: 6.5 G/DL (ref 6.1–8.1)
SODIUM SERPL-SCNC: 139 MMOL/L (ref 135–146)

## 2025-04-04 LAB
ALBUMIN/CREAT UR: 2 MG/G CREAT
CREAT UR-MCNC: 98 MG/DL (ref 20–275)
MICROALBUMIN UR-MCNC: 0.2 MG/DL
PTH-INTACT SERPL-MCNC: 30 PG/ML (ref 16–77)

## 2025-04-07 ENCOUNTER — HOSPITAL ENCOUNTER (OUTPATIENT)
Dept: RADIOLOGY | Facility: HOSPITAL | Age: 55
Discharge: HOME | End: 2025-04-07
Payer: COMMERCIAL

## 2025-04-07 DIAGNOSIS — E83.52 HYPERCALCEMIA: ICD-10-CM

## 2025-04-07 DIAGNOSIS — E03.9 HYPOTHYROIDISM, ADULT: ICD-10-CM

## 2025-04-07 PROCEDURE — 76536 US EXAM OF HEAD AND NECK: CPT

## 2025-04-07 PROCEDURE — 76536 US EXAM OF HEAD AND NECK: CPT | Performed by: RADIOLOGY

## 2025-04-14 RX ORDER — LEVOTHYROXINE SODIUM 50 UG/1
50 TABLET ORAL DAILY
Qty: 90 TABLET | Refills: 1 | Status: SHIPPED | OUTPATIENT
Start: 2025-04-14

## 2025-05-05 DIAGNOSIS — I10 PRIMARY HYPERTENSION: ICD-10-CM

## 2025-05-05 DIAGNOSIS — I10 PRIMARY HYPERTENSION: Primary | ICD-10-CM

## 2025-05-05 DIAGNOSIS — E11.9 TYPE 2 DIABETES MELLITUS WITHOUT COMPLICATION, WITHOUT LONG-TERM CURRENT USE OF INSULIN: ICD-10-CM

## 2025-05-05 DIAGNOSIS — E66.813 CLASS 3 SEVERE OBESITY DUE TO EXCESS CALORIES WITH SERIOUS COMORBIDITY AND BODY MASS INDEX (BMI) OF 40.0 TO 44.9 IN ADULT: ICD-10-CM

## 2025-05-05 RX ORDER — SPIRONOLACTONE 25 MG/1
25 TABLET ORAL DAILY
Qty: 30 TABLET | Refills: 3 | Status: SHIPPED | OUTPATIENT
Start: 2025-05-05 | End: 2025-05-05 | Stop reason: SDUPTHER

## 2025-05-05 RX ORDER — SPIRONOLACTONE 25 MG/1
25 TABLET ORAL DAILY
Qty: 30 TABLET | Refills: 3 | Status: SHIPPED | OUTPATIENT
Start: 2025-05-05

## 2025-06-09 ENCOUNTER — APPOINTMENT (OUTPATIENT)
Dept: PRIMARY CARE | Facility: CLINIC | Age: 55
End: 2025-06-09
Payer: COMMERCIAL

## 2025-06-09 VITALS
SYSTOLIC BLOOD PRESSURE: 120 MMHG | HEART RATE: 75 BPM | DIASTOLIC BLOOD PRESSURE: 60 MMHG | HEIGHT: 64 IN | WEIGHT: 227.8 LBS | BODY MASS INDEX: 38.89 KG/M2 | TEMPERATURE: 97.5 F | RESPIRATION RATE: 16 BRPM | OXYGEN SATURATION: 96 %

## 2025-06-09 DIAGNOSIS — M79.672 HEEL PAIN, BILATERAL: ICD-10-CM

## 2025-06-09 DIAGNOSIS — M79.671 HEEL PAIN, BILATERAL: ICD-10-CM

## 2025-06-09 DIAGNOSIS — M21.40 PES PLANUS, UNSPECIFIED LATERALITY: ICD-10-CM

## 2025-06-09 DIAGNOSIS — E11.9 CONTROLLED TYPE 2 DIABETES MELLITUS WITHOUT COMPLICATION, WITHOUT LONG-TERM CURRENT USE OF INSULIN: Primary | ICD-10-CM

## 2025-06-09 DIAGNOSIS — E03.9 HYPOTHYROIDISM, ADULT: ICD-10-CM

## 2025-06-09 DIAGNOSIS — E83.52 HYPERCALCEMIA: ICD-10-CM

## 2025-06-09 LAB — POC HEMOGLOBIN A1C: 5.6 % (ref 4.2–6.5)

## 2025-06-09 PROCEDURE — 99214 OFFICE O/P EST MOD 30 MIN: CPT | Performed by: INTERNAL MEDICINE

## 2025-06-09 PROCEDURE — 3008F BODY MASS INDEX DOCD: CPT | Performed by: INTERNAL MEDICINE

## 2025-06-09 PROCEDURE — 3074F SYST BP LT 130 MM HG: CPT | Performed by: INTERNAL MEDICINE

## 2025-06-09 PROCEDURE — 3044F HG A1C LEVEL LT 7.0%: CPT | Performed by: INTERNAL MEDICINE

## 2025-06-09 PROCEDURE — 83036 HEMOGLOBIN GLYCOSYLATED A1C: CPT | Performed by: INTERNAL MEDICINE

## 2025-06-09 PROCEDURE — 3078F DIAST BP <80 MM HG: CPT | Performed by: INTERNAL MEDICINE

## 2025-06-09 PROCEDURE — 1036F TOBACCO NON-USER: CPT | Performed by: INTERNAL MEDICINE

## 2025-06-09 PROCEDURE — 4010F ACE/ARB THERAPY RXD/TAKEN: CPT | Performed by: INTERNAL MEDICINE

## 2025-06-09 ASSESSMENT — ENCOUNTER SYMPTOMS
POLYDIPSIA: 1
SPEECH DIFFICULTY: 0
DIZZINESS: 0
VISUAL CHANGE: 0
SWEATS: 0
CONFUSION: 0
BLACKOUTS: 0
HUNGER: 0
WEAKNESS: 0
NERVOUS/ANXIOUS: 0
FATIGUE: 1
POLYPHAGIA: 0
HEADACHES: 0
WEIGHT LOSS: 0
BLURRED VISION: 0
TREMORS: 0
SEIZURES: 0

## 2025-06-09 ASSESSMENT — PATIENT HEALTH QUESTIONNAIRE - PHQ9
SUM OF ALL RESPONSES TO PHQ9 QUESTIONS 1 AND 2: 0
2. FEELING DOWN, DEPRESSED OR HOPELESS: NOT AT ALL
1. LITTLE INTEREST OR PLEASURE IN DOING THINGS: NOT AT ALL

## 2025-06-09 NOTE — PROGRESS NOTES
Subjective   Adriana Rangel is a 55 y.o. female who presents for Diabetes (Follow up DM).  Follow up DM,last HGBA1C was 5.5 on 3.6.25,today is 5.6  Labs - 3.2025,to stop vit D  and repeat  in 1  month labs   Labs - 4.2025  Eye  exam- 11.22.2024  US thyroid done - 4.2025 - to repeat in 1y-4.2026     Diabetes  She has type 2 diabetes mellitus. No MedicAlert identification noted. The initial diagnosis of diabetes was made 10 months ago. Pertinent negatives for hypoglycemia include no confusion, dizziness, headaches, hunger, mood changes, nervousness/anxiousness, pallor, seizures, sleepiness, speech difficulty, sweats or tremors. Associated symptoms include fatigue, polydipsia and polyuria. Pertinent negatives for diabetes include no blurred vision, no chest pain, no foot paresthesias, no foot ulcerations, no polyphagia, no visual change, no weakness and no weight loss. Pertinent negatives for hypoglycemia complications include no blackouts, no hospitalization, no nocturnal hypoglycemia, no required assistance and no required glucagon injection. Symptoms are stable. Pertinent negatives for diabetic complications include no CVA, heart disease, impotence, nephropathy, peripheral neuropathy, PVD or retinopathy. Risk factors for coronary artery disease include dyslipidemia, family history, hypertension, obesity, post-menopausal and sedentary lifestyle. Current diabetic treatment includes oral agent (monotherapy). She is compliant with treatment most of the time. Her weight is stable. Her breakfast blood glucose is taken between 9-10 am. Her lunch blood glucose is taken between 12-1 pm. Her dinner blood glucose is taken between 7-8 pm. Her bedtime blood glucose is taken between 10-11 pm.     This is a diabetes follow up visit for Adriana Rangel. The current treatment includes GLP 1 injections and she is compliant most of the time. Symptoms include frequent  urination. Glucose is monitored: not checking. she reports fair  compliance with a low carbohydrate diet, and   walking   PRN daily.    Patient is prescribed an ACE/ARB/ARNI medication   Patient is prescribed a STATIN medication  Patient is prescribed a SGLT2/GLP1 medication    Last Flu Vaccine given:            12/16/2020   Last PCV Vaccine given:   1969    Lab Results   Component Value Date    HGBA1C 5.6 06/09/2025    CREATININE 0.96 04/03/2025    MICROALBCREA 2 04/03/2025    LDLCALC 63 03/06/2025        Last Eye Exam: patient reports annual screening by optometry/opthalmology     Liver Screening: FIB-4 Calculation: 0.54 at 8/6/2024 10:00 AM  Calculated from:  SGOT/AST: 16 U/L at 8/6/2024 10:00 AM  SGPT/ALT: 23 U/L at 8/6/2024 10:00 AM  Platelets: 338 x10*3/uL at 8/6/2024 10:00 AM  Age: 54 years    Interpretation: <1.45 Cirrhosis less likely, 1.45 - 3.25 Indeterminate, >3.25 Cirrhosis more likely    Review of Systems   Constitutional:  Positive for fatigue. Negative for weight loss.   Eyes:  Negative for blurred vision.   Cardiovascular:  Negative for chest pain.   Endocrine: Positive for polydipsia and polyuria. Negative for polyphagia.   Genitourinary:  Negative for impotence.   Skin:  Negative for pallor.   Neurological:  Negative for dizziness, tremors, seizures, speech difficulty, weakness and headaches.   Psychiatric/Behavioral:  Negative for confusion. The patient is not nervous/anxious.      Visit Vitals  /60 (BP Location: Left arm, Patient Position: Sitting)   Pulse 75   Temp 36.4 °C (97.5 °F)   Resp 16   Body mass index is 39.1 kg/m².   Objective   Physical Exam  Constitutional:       Appearance: Normal appearance.      Comments: Flat feet bilateral   HENT:      Head: Normocephalic and atraumatic.   Eyes:      Pupils: Pupils are equal, round, and reactive to light.   Cardiovascular:      Rate and Rhythm: Normal rate and regular rhythm.   Pulmonary:      Effort: Pulmonary effort is normal.      Breath sounds: Normal breath sounds.   Musculoskeletal:          General: Normal range of motion.      Cervical back: Normal range of motion and neck supple.      Right foot: No deformity, bunion or Charcot foot.      Left foot: No deformity, bunion or Charcot foot.   Feet:      Right foot:      Skin integrity: Skin integrity normal. No ulcer, callus or fissure.      Toenail Condition: Right toenails are normal.      Left foot:      Skin integrity: Skin integrity normal. No ulcer, callus or fissure.      Toenail Condition: Left toenails are normal.      Comments: Foot exam normal,   Skin:     General: Skin is warm.   Neurological:      General: No focal deficit present.      Mental Status: She is alert and oriented to person, place, and time.   Psychiatric:         Mood and Affect: Mood normal.         Behavior: Behavior normal.         Normal BILATERAL diabetic foot exam: Pulses are palpable, sensation is intact, and there are no ulcers or lesions.   Lab Results   Component Value Date    GLUCOSE 113 (H) 04/03/2025    CALCIUM 9.4 04/03/2025     04/03/2025    K 3.7 04/03/2025    CO2 29 04/03/2025    CL 99 04/03/2025    BUN 16 04/03/2025    CREATININE 0.96 04/03/2025     Lab Results   Component Value Date    ALT 13 04/03/2025    AST 11 04/03/2025    ALKPHOS 95 04/03/2025    BILITOT 0.4 04/03/2025     Lab Results   Component Value Date    TSH 2.10 03/07/2025     Lab Results   Component Value Date    DKIZTVCL24 370 03/07/2025     Lab Results   Component Value Date    ALT 13 04/03/2025    AST 11 04/03/2025    ALKPHOS 95 04/03/2025    BILITOT 0.4 04/03/2025     Lab Results   Component Value Date    HGBA1C 5.6 06/09/2025       Assessment & Plan  Controlled type 2 diabetes mellitus without complication, without long-term current use of insulin    Orders:    POCT glycosylated hemoglobin (Hb A1C) manually resulted    Referral to Podiatry; Future    Hypothyroidism, adult         Hypercalcemia         Heel pain, bilateral    Orders:    Referral to Podiatry; Future    Pes planus,  unspecified laterality    Orders:    Referral to Podiatry; Future              Anshul Blanc MD 06/09/25 10:28 AM

## 2025-06-09 NOTE — ASSESSMENT & PLAN NOTE
Orders:    POCT glycosylated hemoglobin (Hb A1C) manually resulted    Referral to Podiatry; Future

## 2025-06-15 DIAGNOSIS — I10 ESSENTIAL (PRIMARY) HYPERTENSION: Primary | ICD-10-CM

## 2025-06-16 RX ORDER — LOSARTAN POTASSIUM 50 MG/1
50 TABLET ORAL DAILY
Qty: 30 TABLET | Refills: 2 | Status: SHIPPED | OUTPATIENT
Start: 2025-06-16

## 2025-07-15 ENCOUNTER — APPOINTMENT (OUTPATIENT)
Dept: PODIATRY | Facility: CLINIC | Age: 55
End: 2025-07-15
Payer: COMMERCIAL

## 2025-07-15 DIAGNOSIS — M72.2 PLANTAR FASCIITIS: Primary | ICD-10-CM

## 2025-07-15 DIAGNOSIS — E11.9 ENCOUNTER FOR DIABETIC FOOT EXAM (MULTI): ICD-10-CM

## 2025-07-15 PROCEDURE — 3044F HG A1C LEVEL LT 7.0%: CPT | Performed by: PODIATRIST

## 2025-07-15 PROCEDURE — 1036F TOBACCO NON-USER: CPT | Performed by: PODIATRIST

## 2025-07-15 PROCEDURE — 4010F ACE/ARB THERAPY RXD/TAKEN: CPT | Performed by: PODIATRIST

## 2025-07-15 PROCEDURE — 99203 OFFICE O/P NEW LOW 30 MIN: CPT | Performed by: PODIATRIST

## 2025-08-17 DIAGNOSIS — I10 PRIMARY HYPERTENSION: Primary | ICD-10-CM

## 2025-08-18 RX ORDER — AMLODIPINE BESYLATE 5 MG/1
5 TABLET ORAL DAILY
Qty: 30 TABLET | Refills: 5 | Status: SHIPPED | OUTPATIENT
Start: 2025-08-18

## 2025-08-24 DIAGNOSIS — I10 PRIMARY HYPERTENSION: Primary | ICD-10-CM

## 2025-08-25 RX ORDER — SPIRONOLACTONE 25 MG/1
25 TABLET ORAL DAILY
Qty: 30 TABLET | Refills: 3 | Status: SHIPPED | OUTPATIENT
Start: 2025-08-25

## 2025-09-10 ENCOUNTER — APPOINTMENT (OUTPATIENT)
Dept: PRIMARY CARE | Facility: CLINIC | Age: 55
End: 2025-09-10
Payer: COMMERCIAL

## 2025-11-28 ENCOUNTER — APPOINTMENT (OUTPATIENT)
Dept: OPHTHALMOLOGY | Facility: CLINIC | Age: 55
End: 2025-11-28
Payer: COMMERCIAL